# Patient Record
Sex: FEMALE | Race: BLACK OR AFRICAN AMERICAN | NOT HISPANIC OR LATINO | Employment: OTHER | URBAN - METROPOLITAN AREA
[De-identification: names, ages, dates, MRNs, and addresses within clinical notes are randomized per-mention and may not be internally consistent; named-entity substitution may affect disease eponyms.]

---

## 2017-04-06 ENCOUNTER — TELEPHONE (OUTPATIENT)
Dept: OBSTETRICS AND GYNECOLOGY | Facility: CLINIC | Age: 62
End: 2017-04-06

## 2017-04-06 NOTE — TELEPHONE ENCOUNTER
Patient was scheduled for annual exam on Thursday 6/22,reminder letter mailed,patient also states that she need the pap sear report for her insurance mailed to her,I verified patient's address on file and mailed on pap smear reports from 2013 and 2016,verbilazed understanding.

## 2017-04-06 NOTE — TELEPHONE ENCOUNTER
----- Message from Jayde Gomes sent at 4/6/2017 10:13 AM CDT -----  Contact: pt   X_  1st Request  _  2nd Request  _  3rd Request    Who:LEWIS MARCUS [[id number]]    Why: Patient states she would like to speak with the staff in regards to getting a copy of her PAP results... Please contact patient to further discuss and advise     What Number to Call Back: [phone]    When to Expect a call back: (Before the end of the day)   -- if call after 3:00 call back will be tomorrow.

## 2017-06-22 ENCOUNTER — OFFICE VISIT (OUTPATIENT)
Dept: OBSTETRICS AND GYNECOLOGY | Facility: CLINIC | Age: 62
End: 2017-06-22
Attending: OBSTETRICS & GYNECOLOGY
Payer: OTHER GOVERNMENT

## 2017-06-22 VITALS
DIASTOLIC BLOOD PRESSURE: 90 MMHG | WEIGHT: 153.69 LBS | SYSTOLIC BLOOD PRESSURE: 144 MMHG | BODY MASS INDEX: 30.17 KG/M2 | HEIGHT: 60 IN

## 2017-06-22 DIAGNOSIS — Z01.411 ENCOUNTER FOR GYNECOLOGICAL EXAMINATION WITH ABNORMAL FINDING: Primary | ICD-10-CM

## 2017-06-22 DIAGNOSIS — N64.4 MASTALGIA IN FEMALE: ICD-10-CM

## 2017-06-22 PROCEDURE — 99213 OFFICE O/P EST LOW 20 MIN: CPT | Mod: PBBFAC | Performed by: OBSTETRICS & GYNECOLOGY

## 2017-06-22 PROCEDURE — 99396 PREV VISIT EST AGE 40-64: CPT | Mod: S$PBB,,, | Performed by: OBSTETRICS & GYNECOLOGY

## 2017-06-22 PROCEDURE — 99999 PR PBB SHADOW E&M-EST. PATIENT-LVL III: CPT | Mod: PBBFAC,,, | Performed by: OBSTETRICS & GYNECOLOGY

## 2017-06-22 NOTE — PROGRESS NOTES
SUBJECTIVE:   62 y.o. female   for annual routine Pap and checkup. No LMP recorded. Patient is postmenopausal..  She complains of left breast pain.  Had recent mmg which was normal.  Throbbing..        Past Medical History:   Diagnosis Date    Clotting disorder     GERD (gastroesophageal reflux disease)     Glaucoma     elevated pressure in left eye     Hypertension     MGUS (monoclonal gammopathy of unknown significance)      History reviewed. No pertinent surgical history.  Social History     Social History    Marital status:      Spouse name: N/A    Number of children: N/A    Years of education: N/A     Occupational History    Not on file.     Social History Main Topics    Smoking status: Former Smoker     Packs/day: 0.50     Quit date: 1996    Smokeless tobacco: Former User    Alcohol use 1.2 oz/week     2 Cans of beer per week    Drug use: No    Sexual activity: Not Currently     Partners: Male     Birth control/ protection: Post-menopausal     Other Topics Concern    Not on file     Social History Narrative    No narrative on file     Family History   Problem Relation Age of Onset    Clotting disorder Mother     Colon cancer Mother     Ovarian cancer Neg Hx     Breast cancer Neg Hx      OB History    Para Term  AB Living   0 0 0 0 0 0   SAB TAB Ectopic Multiple Live Births   0 0 0 0                 Current Outpatient Prescriptions   Medication Sig Dispense Refill    diltiazem (TIAZAC) 240 MG CpSR Take 240 mg by mouth.  Capsule, Sustained Release Oral      dorzolamide (TRUSOPT) 2 % ophthalmic solution Place 1 drop into both eyes 2 (two) times daily.      nepafenac (NEVANAC) 0.1 % ophthalmic suspension Place 1 drop into both eyes 2 (two) times daily.        travoprost, benzalkonium, (TRAVATAN) 0.004 % ophthalmic solution Place 1 drop into both eyes nightly.       No current facility-administered medications for this visit.      Allergies: Review of  patient's allergies indicates no known allergies.     ROS:  Constitutional: no weight loss, weight gain, fever, fatigue  Eyes:  No vision changes, glasses/contacts  ENT/Mouth: No ulcers, sinus problems, ears ringing, headache  Cardiovascular: No inability to lie flat, chest pain, exercise intolerance, swelling, heart palpitations  Respiratory: No wheezing, coughing blood, shortness of breath, or cough  Gastrointestinal: No diarrhea, bloody stool, nausea/vomiting, constipation, gas, hemorrhoids  Genitourinary: No blood in urine, painful urination, urgency of urination, frequency of urination, incomplete emptying, incontinence, abnormal bleeding, painful periods, heavy periods, vaginal discharge, vaginal odor, painful intercourse, sexual problems, bleeding after intercourse.  Musculoskeletal: No muscle weakness  Skin/Breast: No painful breasts, nipple discharge, masses, rash, ulcers  Neurological: No passing out, seizures, numbness, headache  Endocrine: No diabetes, hypothyroid, hyperthyroid, hot flashes, hair loss, abnormal hair growth, ance  Psychiatric: No depression, crying  Hematologic: No bruises, bleeding, swollen lymph nodes, anemia.      OBJECTIVE:   The patient appears well, alert, oriented x 3, in no distress.  BP (!) 144/90 (BP Method: Manual)   Ht 5' (1.524 m)   Wt 69.7 kg (153 lb 10.6 oz)   BMI 30.01 kg/m²   NECK: no thyromegaly, trachea midline  SKIN: no acne, striae, hirsutism  BREAST EXAM: breasts appear normal, no suspicious masses, no skin or nipple changes or axillary nodes  ABDOMEN: soft, non-tender; bowel sounds normal; no masses,  no organomegaly and no hernias, masses, or hepatosplenomegaly  GENITALIA: normal external genitalia, no erythema, no discharge  URETHRA: normal appearing urethra with no masses, tenderness or lesions and normal urethra, normal urethral meatus  VAGINA: Normal  CERVIX: no lesions or cervical motion tenderness  UTERUS: normal size, contour, position, consistency,  mobility, non-tender  ADNEXA: no mass, fullness, tenderness    \  ASSESSMENT:   Viji was seen today for well woman and breast pain.    Diagnoses and all orders for this visit:    Encounter for gynecological examination with abnormal finding  Health Maintenance   Topic Date Due    Hepatitis C Screening  1955    Lipid Panel  1955    TETANUS VACCINE  03/30/1973    Colonoscopy  03/30/2005    Zoster Vaccine  03/30/2015    Influenza Vaccine  08/01/2017    Mammogram  04/18/2018    Pap Smear with HPV Cotest  06/21/2019       Mastalgia in female

## 2018-05-03 ENCOUNTER — TELEPHONE (OUTPATIENT)
Dept: OBSTETRICS AND GYNECOLOGY | Facility: CLINIC | Age: 63
End: 2018-05-03

## 2018-05-03 NOTE — TELEPHONE ENCOUNTER
----- Message from Jacqueline Garland sent at 5/3/2018  3:11 PM CDT -----  Contact: LEWIS MARCUS [[id number]]      Name of Who is Calling: LEWIS MARCUS [[id number]]      What is the request in detail: pt would like to confirm mammogram results was received by fax and added to chart. Please call     Can the clinic reply by [facility]: no    What Number to Call Back if not in [facility]: [phone]

## 2018-05-03 NOTE — TELEPHONE ENCOUNTER
Spoke with Pt;  Pt was calling to confirm that we had received her mammogram results via fax to be scanned into chart.    As of this afternoon, results have not be received.  Instructed  pt that I would call her in am to confirm or deny receipt of results.    Pt verbalized understanding and thanked me for call.

## 2018-05-04 ENCOUNTER — TELEPHONE (OUTPATIENT)
Dept: OBSTETRICS AND GYNECOLOGY | Facility: CLINIC | Age: 63
End: 2018-05-04

## 2018-05-04 NOTE — TELEPHONE ENCOUNTER
Spoke with pt:  Informed pt that I had received her Mammogram report from [facility] and would have it scanned into her chart.    Pt verbalized understanding and thanked me for call.

## 2018-06-25 ENCOUNTER — OFFICE VISIT (OUTPATIENT)
Dept: OBSTETRICS AND GYNECOLOGY | Facility: CLINIC | Age: 63
End: 2018-06-25
Payer: OTHER GOVERNMENT

## 2018-06-25 VITALS
DIASTOLIC BLOOD PRESSURE: 72 MMHG | SYSTOLIC BLOOD PRESSURE: 142 MMHG | WEIGHT: 152.13 LBS | BODY MASS INDEX: 29.86 KG/M2 | HEIGHT: 60 IN

## 2018-06-25 DIAGNOSIS — Z01.419 ENCOUNTER FOR CERVICAL PAP SMEAR WITH PELVIC EXAM: Primary | ICD-10-CM

## 2018-06-25 PROCEDURE — 99999 PR PBB SHADOW E&M-EST. PATIENT-LVL II: CPT | Mod: PBBFAC,,, | Performed by: OBSTETRICS & GYNECOLOGY

## 2018-06-25 PROCEDURE — 99396 PREV VISIT EST AGE 40-64: CPT | Mod: S$PBB,,, | Performed by: OBSTETRICS & GYNECOLOGY

## 2018-06-25 PROCEDURE — 99212 OFFICE O/P EST SF 10 MIN: CPT | Mod: PBBFAC | Performed by: OBSTETRICS & GYNECOLOGY

## 2018-06-25 RX ORDER — BRIMONIDINE TARTRATE, TIMOLOL MALEATE 2; 5 MG/ML; MG/ML
SOLUTION/ DROPS OPHTHALMIC
COMMUNITY
Start: 2018-04-03

## 2018-06-28 NOTE — PROGRESS NOTES
SUBJECTIVE:   63 y.o. female   for annual routine Pap and checkup. No LMP recorded. Patient is postmenopausal..  She has no unusual complaints.        Past Medical History:   Diagnosis Date    Clotting disorder     GERD (gastroesophageal reflux disease)     Glaucoma     elevated pressure in left eye     Hypertension     MGUS (monoclonal gammopathy of unknown significance)      History reviewed. No pertinent surgical history.  Social History     Social History    Marital status:      Spouse name: N/A    Number of children: N/A    Years of education: N/A     Occupational History    Not on file.     Social History Main Topics    Smoking status: Former Smoker     Packs/day: 0.50     Quit date: 1996    Smokeless tobacco: Former User    Alcohol use 1.2 oz/week     2 Cans of beer per week    Drug use: No    Sexual activity: Not Currently     Partners: Male     Birth control/ protection: Post-menopausal     Other Topics Concern    Not on file     Social History Narrative    No narrative on file     Family History   Problem Relation Age of Onset    Clotting disorder Mother     Colon cancer Mother     Ovarian cancer Neg Hx     Breast cancer Neg Hx      OB History    Para Term  AB Living   0 0 0 0 0 0   SAB TAB Ectopic Multiple Live Births   0 0 0 0                 Current Outpatient Prescriptions   Medication Sig Dispense Refill    COMBIGAN 0.2-0.5 % Drop       diltiazem (TIAZAC) 240 MG CpSR Take 240 mg by mouth.  Capsule, Sustained Release Oral      travoprost, benzalkonium, (TRAVATAN) 0.004 % ophthalmic solution Place 1 drop into both eyes nightly.      dorzolamide (TRUSOPT) 2 % ophthalmic solution Place 1 drop into both eyes 2 (two) times daily.       No current facility-administered medications for this visit.      Allergies: Patient has no known allergies.     ROS:  Constitutional: no weight loss, weight gain, fever, fatigue  Eyes:  No vision changes,  glasses/contacts  ENT/Mouth: No ulcers, sinus problems, ears ringing, headache  Cardiovascular: No inability to lie flat, chest pain, exercise intolerance, swelling, heart palpitations  Respiratory: No wheezing, coughing blood, shortness of breath, or cough  Gastrointestinal: No diarrhea, bloody stool, nausea/vomiting, constipation, gas, hemorrhoids  Genitourinary: No blood in urine, painful urination, urgency of urination, frequency of urination, incomplete emptying, incontinence, abnormal bleeding, painful periods, heavy periods, vaginal discharge, vaginal odor, painful intercourse, sexual problems, bleeding after intercourse.  Musculoskeletal: No muscle weakness  Skin/Breast: No painful breasts, nipple discharge, masses, rash, ulcers  Neurological: No passing out, seizures, numbness, headache  Endocrine: No diabetes, hypothyroid, hyperthyroid, hot flashes, hair loss, abnormal hair growth, ance  Psychiatric: No depression, crying  Hematologic: No bruises, bleeding, swollen lymph nodes, anemia.      OBJECTIVE:   The patient appears well, alert, oriented x 3, in no distress.  BP (!) 142/72   Ht 5' (1.524 m)   Wt 69 kg (152 lb 1.9 oz)   BMI 29.71 kg/m²   NECK: no thyromegaly, trachea midline  SKIN: no acne, striae, hirsutism  BREAST EXAM: breasts appear normal, no suspicious masses, no skin or nipple changes or axillary nodes  ABDOMEN: no hernias, masses, or hepatosplenomegaly  GENITALIA: normal external genitalia, no erythema, no discharge  URETHRA: normal urethra, normal urethral meatus  VAGINA: Normal  CERVIX: no lesions or cervical motion tenderness  UTERUS: normal size, contour, position, consistency, mobility, non-tender  ADNEXA: no mass, fullness, tenderness    \  ASSESSMENT:   Viji was seen today for annual exam.    Diagnoses and all orders for this visit:    Encounter for cervical Pap smear with pelvic exam  Health Maintenance   Topic Date Due    Hepatitis C Screening  1955    Lipid Panel   1955    TETANUS VACCINE  03/30/1973    Colonoscopy  03/30/2005    Zoster Vaccine  03/30/2015    Mammogram  04/18/2018    Influenza Vaccine  08/01/2018    Pap Smear with HPV Cotest  06/21/2019     MMG at [facility] and normal.

## 2019-06-25 ENCOUNTER — OFFICE VISIT (OUTPATIENT)
Dept: OBSTETRICS AND GYNECOLOGY | Facility: CLINIC | Age: 64
End: 2019-06-25
Payer: OTHER GOVERNMENT

## 2019-06-25 VITALS
DIASTOLIC BLOOD PRESSURE: 88 MMHG | SYSTOLIC BLOOD PRESSURE: 144 MMHG | HEIGHT: 60 IN | BODY MASS INDEX: 30.43 KG/M2 | WEIGHT: 155 LBS

## 2019-06-25 DIAGNOSIS — Z12.31 ENCOUNTER FOR SCREENING MAMMOGRAM FOR MALIGNANT NEOPLASM OF BREAST: ICD-10-CM

## 2019-06-25 DIAGNOSIS — Z01.419 ENCOUNTER FOR CERVICAL PAP SMEAR WITH PELVIC EXAM: Primary | ICD-10-CM

## 2019-06-25 PROCEDURE — 99396 PR PREVENTIVE VISIT,EST,40-64: ICD-10-PCS | Mod: S$PBB,,, | Performed by: OBSTETRICS & GYNECOLOGY

## 2019-06-25 PROCEDURE — 99396 PREV VISIT EST AGE 40-64: CPT | Mod: S$PBB,,, | Performed by: OBSTETRICS & GYNECOLOGY

## 2019-06-25 PROCEDURE — 87624 HPV HI-RISK TYP POOLED RSLT: CPT

## 2019-06-25 PROCEDURE — 99999 PR PBB SHADOW E&M-EST. PATIENT-LVL III: CPT | Mod: PBBFAC,,, | Performed by: OBSTETRICS & GYNECOLOGY

## 2019-06-25 PROCEDURE — 88175 CYTOPATH C/V AUTO FLUID REDO: CPT

## 2019-06-25 PROCEDURE — 99999 PR PBB SHADOW E&M-EST. PATIENT-LVL III: ICD-10-PCS | Mod: PBBFAC,,, | Performed by: OBSTETRICS & GYNECOLOGY

## 2019-06-25 PROCEDURE — 99213 OFFICE O/P EST LOW 20 MIN: CPT | Mod: PBBFAC | Performed by: OBSTETRICS & GYNECOLOGY

## 2019-06-25 RX ORDER — LATANOPROSTENE BUNOD 0.24 MG/ML
SOLUTION/ DROPS OPHTHALMIC
COMMUNITY
Start: 2019-04-25 | End: 2022-06-16

## 2019-06-25 NOTE — PROGRESS NOTES
SUBJECTIVE:   64 y.o. female   for annual routine Pap and checkup. No LMP recorded. Patient is postmenopausal..  She has no unusual complaints.        Past Medical History:   Diagnosis Date    Clotting disorder     GERD (gastroesophageal reflux disease)     Glaucoma     elevated pressure in left eye     Hypertension     MGUS (monoclonal gammopathy of unknown significance)      History reviewed. No pertinent surgical history.  Social History     Socioeconomic History    Marital status:      Spouse name: Not on file    Number of children: Not on file    Years of education: Not on file    Highest education level: Not on file   Occupational History    Not on file   Social Needs    Financial resource strain: Not on file    Food insecurity:     Worry: Not on file     Inability: Not on file    Transportation needs:     Medical: Not on file     Non-medical: Not on file   Tobacco Use    Smoking status: Former Smoker     Packs/day: 0.50     Last attempt to quit: 1996     Years since quittin.4    Smokeless tobacco: Former User   Substance and Sexual Activity    Alcohol use: Yes     Alcohol/week: 1.2 oz     Types: 2 Cans of beer per week    Drug use: No    Sexual activity: Not Currently     Partners: Male     Birth control/protection: Post-menopausal   Lifestyle    Physical activity:     Days per week: Not on file     Minutes per session: Not on file    Stress: Not on file   Relationships    Social connections:     Talks on phone: Not on file     Gets together: Not on file     Attends Worship service: Not on file     Active member of club or organization: Not on file     Attends meetings of clubs or organizations: Not on file     Relationship status: Not on file   Other Topics Concern    Not on file   Social History Narrative    Not on file     Family History   Problem Relation Age of Onset    Clotting disorder Mother     Colon cancer Mother     Ovarian cancer Neg Hx      Breast cancer Neg Hx      OB History    Para Term  AB Living   0 0 0 0 0 0   SAB TAB Ectopic Multiple Live Births   0 0 0 0           Current Outpatient Medications   Medication Sig Dispense Refill    COMBIGAN 0.2-0.5 % Drop       diltiazem (TIAZAC) 240 MG CpSR Take 240 mg by mouth.  Capsule, Sustained Release Oral      dorzolamide (TRUSOPT) 2 % ophthalmic solution Place 1 drop into both eyes 2 (two) times daily.      VYZULTA 0.024 % Drop        No current facility-administered medications for this visit.      Allergies: Patient has no known allergies.     ROS:  Constitutional: no weight loss, weight gain, fever, fatigue  Eyes:  No vision changes, glasses/contacts  ENT/Mouth: No ulcers, sinus problems, ears ringing, headache  Cardiovascular: No inability to lie flat, chest pain, exercise intolerance, swelling, heart palpitations  Respiratory: No wheezing, coughing blood, shortness of breath, or cough  Gastrointestinal: No diarrhea, bloody stool, nausea/vomiting, constipation, gas, hemorrhoids  Genitourinary: No blood in urine, painful urination, urgency of urination, frequency of urination, incomplete emptying, incontinence, abnormal bleeding, painful periods, heavy periods, vaginal discharge, vaginal odor, painful intercourse, sexual problems, bleeding after intercourse.  Musculoskeletal: No muscle weakness  Skin/Breast: No painful breasts, nipple discharge, masses, rash, ulcers  Neurological: No passing out, seizures, numbness, headache  Endocrine: No diabetes, hypothyroid, hyperthyroid, hot flashes, hair loss, abnormal hair growth, ance  Psychiatric: No depression, crying  Hematologic: No bruises, bleeding, swollen lymph nodes, anemia.      OBJECTIVE:   The patient appears well, alert, oriented x 3, in no distress.  BP (!) 144/88 (BP Location: Right arm, Patient Position: Sitting, BP Method: Medium (Manual))   Ht 5' (1.524 m)   Wt 70.3 kg (154 lb 15.7 oz)   BMI 30.27 kg/m²   NECK: no  thyromegaly, trachea midline  SKIN: no acne, striae, hirsutism  BREAST EXAM: breasts appear normal, no suspicious masses, no skin or nipple changes or axillary nodes  ABDOMEN: no hernias, masses, or hepatosplenomegaly  GENITALIA: normal external genitalia, no erythema, no discharge  URETHRA: normal appearing urethra with no masses, tenderness or lesions and normal urethra, normal urethral meatus  VAGINA: Normal  CERVIX: no lesions or cervical motion tenderness  UTERUS: normal size, contour, position, consistency, mobility, non-tender  ADNEXA: no mass, fullness, tenderness    \  ASSESSMENT:   Viji was seen today for well woman.    Diagnoses and all orders for this visit:    Encounter for cervical Pap smear with pelvic exam  -     Liquid-based pap smear, screening  -     HPV High Risk Genotypes, PCR  If normal, can repeat in 3 years and then stop.    Encounter for screening mammogram for malignant neoplasm of breast  -     Mammo Digital Screening Bilat w/ Reji; Future

## 2019-07-01 LAB
HPV HR 12 DNA CVX QL NAA+PROBE: NEGATIVE
HPV16 AG SPEC QL: NEGATIVE
HPV18 DNA SPEC QL NAA+PROBE: NEGATIVE

## 2019-07-02 ENCOUNTER — TELEPHONE (OUTPATIENT)
Dept: OBSTETRICS AND GYNECOLOGY | Facility: CLINIC | Age: 64
End: 2019-07-02

## 2019-07-02 NOTE — TELEPHONE ENCOUNTER
----- Message from Jaskaran Young sent at 7/2/2019  4:08 PM CDT -----  Please mail pt results from her pap

## 2019-07-02 NOTE — TELEPHONE ENCOUNTER
Spoke with pt    Pt was informed that her pap smear results were not available yet and letter would be sent upon Dr Colindres reviewing the results.    Pt verbalized understanding.

## 2020-03-23 ENCOUNTER — TELEPHONE (OUTPATIENT)
Dept: OBSTETRICS AND GYNECOLOGY | Facility: CLINIC | Age: 65
End: 2020-03-23

## 2020-03-23 NOTE — LETTER
March 23, 2020    Arlene Garcia  [address]  [place] [address]             [facility]  [address] [address]  Phone: [phone] Dear Ms. Garcia:    The results of your most recent Pap smear are normal. This means that no cancerous or precancerous cells were seen. We recommend that you come back in 2 year for your annual exam and 2 years for your next Pap smear.    If you have any questions or concerns, please don't hesitate to call.    Sincerely,        Leroy Colindres MD

## 2020-05-06 ENCOUNTER — TELEPHONE (OUTPATIENT)
Dept: OBSTETRICS AND GYNECOLOGY | Facility: CLINIC | Age: 65
End: 2020-05-06

## 2020-05-06 NOTE — TELEPHONE ENCOUNTER
----- Message from Tegan Swanson sent at 5/6/2020  2:08 PM CDT -----  Contact: LEWIS MARCUS [[id number]]  Name of Who is Calling: LEWIS MARCUS [[id number]]    What is the request in detail: Would like to speak with staff to schedule WWE appointment. No appointments populated after 6/25. Please contact to further discuss and advise      Can the clinic reply by [facility]: no    What Number to Call Back if not in [facility]: [phone]

## 2020-05-06 NOTE — TELEPHONE ENCOUNTER
----- Message from Katherine Horne sent at 5/6/2020  4:06 PM CDT -----  Contact: LEWIS MARCUS [[id number]]  Type:  Patient Returning Call    Who Called: LEWIS MARCUS [[id number]]    Who Left Message for Patient: Lucien Shah    Does the patient know what this is regarding?:N     Best Call Back Number:[phone]    Additional Information:

## 2020-05-26 ENCOUNTER — LAB VISIT (OUTPATIENT)
Dept: PRIMARY CARE CLINIC | Facility: CLINIC | Age: 65
End: 2020-05-26
Payer: MEDICARE

## 2020-05-26 DIAGNOSIS — R05.9 COUGH: Primary | ICD-10-CM

## 2020-05-26 PROCEDURE — U0003 INFECTIOUS AGENT DETECTION BY NUCLEIC ACID (DNA OR RNA); SEVERE ACUTE RESPIRATORY SYNDROME CORONAVIRUS 2 (SARS-COV-2) (CORONAVIRUS DISEASE [COVID-19]), AMPLIFIED PROBE TECHNIQUE, MAKING USE OF HIGH THROUGHPUT TECHNOLOGIES AS DESCRIBED BY CMS-2020-01-R: HCPCS

## 2020-05-27 LAB — SARS-COV-2 RNA RESP QL NAA+PROBE: NOT DETECTED

## 2020-05-28 ENCOUNTER — TELEPHONE (OUTPATIENT)
Dept: EMERGENCY MEDICINE | Facility: HOSPITAL | Age: 65
End: 2020-05-28

## 2020-05-28 NOTE — TELEPHONE ENCOUNTER
Pt left voicemail, returned call. Results of negative COVID-19 testing relayed to patient. Patient requesting documentation of negative COVID-19 results for clearance for colonoscopy - patient sent a message on portal with her results. Also, sent a letter to patients' home address with results.     Brenda Butt PA-C

## 2020-06-11 ENCOUNTER — OFFICE VISIT (OUTPATIENT)
Dept: OBSTETRICS AND GYNECOLOGY | Facility: CLINIC | Age: 65
End: 2020-06-11
Payer: MEDICARE

## 2020-06-11 VITALS
DIASTOLIC BLOOD PRESSURE: 80 MMHG | BODY MASS INDEX: 28.87 KG/M2 | SYSTOLIC BLOOD PRESSURE: 140 MMHG | WEIGHT: 147.06 LBS | HEIGHT: 60 IN

## 2020-06-11 DIAGNOSIS — Z12.31 ENCOUNTER FOR SCREENING MAMMOGRAM FOR MALIGNANT NEOPLASM OF BREAST: ICD-10-CM

## 2020-06-11 DIAGNOSIS — Z01.419 WELL WOMAN EXAM WITH ROUTINE GYNECOLOGICAL EXAM: Primary | ICD-10-CM

## 2020-06-11 DIAGNOSIS — R10.32 COLICKY LLQ ABDOMINAL PAIN: ICD-10-CM

## 2020-06-11 PROCEDURE — G0101 CA SCREEN;PELVIC/BREAST EXAM: HCPCS | Mod: S$PBB,,, | Performed by: OBSTETRICS & GYNECOLOGY

## 2020-06-11 PROCEDURE — 99213 OFFICE O/P EST LOW 20 MIN: CPT | Mod: PBBFAC,25 | Performed by: OBSTETRICS & GYNECOLOGY

## 2020-06-11 PROCEDURE — G0101 PR CA SCREEN;PELVIC/BREAST EXAM: ICD-10-PCS | Mod: S$PBB,,, | Performed by: OBSTETRICS & GYNECOLOGY

## 2020-06-11 PROCEDURE — 99999 PR PBB SHADOW E&M-EST. PATIENT-LVL III: ICD-10-PCS | Mod: PBBFAC,,, | Performed by: OBSTETRICS & GYNECOLOGY

## 2020-06-11 PROCEDURE — 99999 PR PBB SHADOW E&M-EST. PATIENT-LVL III: CPT | Mod: PBBFAC,,, | Performed by: OBSTETRICS & GYNECOLOGY

## 2020-06-11 PROCEDURE — G0101 CA SCREEN;PELVIC/BREAST EXAM: HCPCS | Mod: PBBFAC

## 2020-06-11 RX ORDER — ACETAZOLAMIDE 250 MG/1
TABLET ORAL
COMMUNITY
Start: 2020-05-22

## 2020-06-11 RX ORDER — MOXIFLOXACIN 5 MG/ML
SOLUTION/ DROPS OPHTHALMIC
COMMUNITY
Start: 2020-05-22 | End: 2022-06-16

## 2020-06-11 RX ORDER — TRANEXAMIC ACID 650 MG/1
TABLET ORAL
COMMUNITY
Start: 2020-03-25 | End: 2022-06-16

## 2020-06-11 NOTE — PROGRESS NOTES
Subjective:       Patient ID: Arlene Garcia is a 65 y.o. female.    Chief Complaint:  Well Woman and Abdominal Pain      History of Present Illness  HPI  Annual Exam-Postmenopausal  Patient presents for annual exam. The patient has no complaints today. The patient is sexually active. GYN screening history: last pap: was normal and last mammogram: was normal. The patient is not taking hormone replacement therapy. Patient denies post-menopausal vaginal bleeding. The patient wears seatbelts: yes. The patient participates in regular exercise: no. Has the patient ever been transfused or tattooed?: no. The patient reports that there is not domestic violence in her life.    Has been recently having some LLQ pain.  Seems colicy.  No clear triggers.  No increase in abd girth.  No blood in stool.  Has an appt with GI next week.     GYN & OB History  No LMP recorded. Patient is postmenopausal.   Date of Last Pap: 2019    OB History    Para Term  AB Living   0 0 0 0 0 0   SAB TAB Ectopic Multiple Live Births   0 0 0 0         Review of Systems  Review of Systems   Constitutional: Negative for activity change, appetite change and fatigue.   HENT: Negative.  Negative for tinnitus.    Eyes: Negative.    Respiratory: Negative for cough and shortness of breath.    Cardiovascular: Negative for chest pain and palpitations.   Gastrointestinal: Positive for abdominal pain. Negative for blood in stool, constipation, diarrhea and nausea.   Endocrine: Negative.  Negative for hot flashes.   Genitourinary: Negative for dysmenorrhea, dyspareunia, dysuria, frequency, menstrual problem, pelvic pain, vaginal discharge, urinary incontinence and postcoital bleeding.   Musculoskeletal: Negative for back pain and joint swelling.   Integumentary:  Negative for rash, breast mass, nipple discharge and breast skin changes.   Neurological: Negative.  Negative for headaches.   Hematological: Negative.  Does not bruise/bleed easily.    Psychiatric/Behavioral: The patient is not nervous/anxious.    Breast: negative.  Negative for mass, nipple discharge and skin changes          Objective:    Physical Exam:   Constitutional: Vital signs are normal. She appears well-developed and well-nourished. She is active and cooperative. She does not appear ill. No distress.    HENT:   Head: Normocephalic and atraumatic.   Nose: Nose normal.   Mouth/Throat: Oropharynx is clear and moist and mucous membranes are normal.    Eyes: Pupils are equal, round, and reactive to light. Conjunctivae, EOM and lids are normal.    Neck: Full passive range of motion without pain. No muscular tenderness present. No neck rigidity. No thyroid mass and no thyromegaly present.    Cardiovascular: Normal rate, regular rhythm, S1 normal, S2 normal, normal heart sounds and normal pulses.     Pulmonary/Chest: Effort normal and breath sounds normal. No accessory muscle usage. Right breast exhibits no inverted nipple, no mass, no nipple discharge, no skin change, no tenderness and no swelling. Left breast exhibits no inverted nipple, no mass, no nipple discharge, no skin change, no tenderness and no swelling.        Abdominal: Soft. Normal appearance and bowel sounds are normal. She exhibits no distension, no ascites and no mass. There is no hepatosplenomegaly. There is no tenderness. There is no rigidity, no rebound and no guarding.     Genitourinary: Vagina normal and uterus normal. Pelvic exam was performed with patient supine. There is no tenderness or injury on the right labia. There is no tenderness or injury on the left labia. Uterus is not deviated, not hosting fibroids and not experiencing uterine prolapse. Cervix is normal. Right adnexum displays no mass and no fullness. Left adnexum displays no mass and no fullness. No erythema, rectocele or cystocele in the vagina. No vaginal discharge found. Labial bartholins normal.Cervix exhibits no motion tenderness and no discharge.               Lymphadenopathy:        Right: No inguinal adenopathy present.        Left: No inguinal adenopathy present.    Neurological: She is alert. She has normal strength.    Skin: Skin is warm, dry and intact.    Psychiatric: She has a normal mood and affect. Her behavior is normal. Thought content normal. Her mood appears not anxious. Her affect is not inappropriate. Her speech is not slurred. She is not agitated and not aggressive. Thought content is not paranoid. Cognition and memory are normal. She expresses no suicidal plans and no homicidal plans.          Assessment:        1. Well woman exam with routine gynecological exam    2. Colicky LLQ abdominal pain    3. Encounter for screening mammogram for malignant neoplasm of breast                Plan:      Arlene was seen today for well woman and abdominal pain.    Diagnoses and all orders for this visit:    Well woman exam with routine gynecological exam  Health Maintenance   Topic Date Due    Hepatitis C Screening  1955    TETANUS VACCINE  03/30/1973    DEXA SCAN  03/30/1995    Colonoscopy  03/30/2005    Mammogram  04/18/2018    Pneumococcal Vaccine (65+ Low/Medium Risk) (1 of 2 - PCV13) 03/30/2020    Lipid Panel  05/01/2025       Colicky LLQ abdominal pain  We discussed utility of transvaginal u/s to further assess left ovary.  Pt nervous about discomfort from a pelvic u/s.  Advised pt that our clinical exam was negative and she wants to await her evaluation by GI prior to proceeding with transvaginal u/s.  She will call our office if she wishes to proceed.    Encounter for screening mammogram for malignant neoplasm of breast  -     Mammo Digital Screening Bilat w/ Reji; Future

## 2020-06-12 ENCOUNTER — TELEPHONE (OUTPATIENT)
Dept: OBSTETRICS AND GYNECOLOGY | Facility: CLINIC | Age: 65
End: 2020-06-12

## 2020-06-12 NOTE — TELEPHONE ENCOUNTER
Spoke with pt  Staff instructed pt that Dr Colindres placed her Mammo orders as part of her WWE exam.  Pt has not had Mammograms done at [facility] - she uses [facility] for her Mammo screening.  Pt is having 2019 records sent to Dr Colindres's office to be scanned to her Chart.    Pt verbalized understanding.

## 2020-06-12 NOTE — TELEPHONE ENCOUNTER
----- Message from Tegan Swanson sent at 6/12/2020 12:41 PM CDT -----  Contact: LEWIS MARCUS [[id number]]  Name of Who is Calling: LEWIS MARCUS [[id number]]    What is the request in detail: Patient states she had mammogram done already and she would like to know why is there another one being ordered.  Please contact to further discuss and advise      Can the clinic reply by [facility]: no    What Number to Call Back if not in [facility]: [phone]

## 2020-09-20 ENCOUNTER — HOSPITAL ENCOUNTER (EMERGENCY)
Facility: HOSPITAL | Age: 65
Discharge: HOME | End: 2020-09-20
Attending: EMERGENCY MEDICINE
Payer: MEDICARE

## 2020-09-20 VITALS
BODY MASS INDEX: 28.71 KG/M2 | WEIGHT: 147 LBS | HEART RATE: 58 BPM | OXYGEN SATURATION: 97 % | TEMPERATURE: 98 F | DIASTOLIC BLOOD PRESSURE: 82 MMHG | SYSTOLIC BLOOD PRESSURE: 157 MMHG | RESPIRATION RATE: 18 BRPM

## 2020-09-20 DIAGNOSIS — R42 DIZZINESS: ICD-10-CM

## 2020-09-20 DIAGNOSIS — R42 VERTIGO: Primary | ICD-10-CM

## 2020-09-20 PROCEDURE — 93010 EKG 12-LEAD: ICD-10-PCS | Mod: ,,, | Performed by: INTERNAL MEDICINE

## 2020-09-20 PROCEDURE — 25000003 PHARM REV CODE 250: Mod: ER | Performed by: EMERGENCY MEDICINE

## 2020-09-20 PROCEDURE — 93010 ELECTROCARDIOGRAM REPORT: CPT | Mod: ,,, | Performed by: INTERNAL MEDICINE

## 2020-09-20 PROCEDURE — 93005 ELECTROCARDIOGRAM TRACING: CPT | Mod: ER

## 2020-09-20 PROCEDURE — 99283 EMERGENCY DEPT VISIT LOW MDM: CPT | Mod: 25,ER

## 2020-09-20 RX ORDER — DIAZEPAM 5 MG/1
5 TABLET ORAL
Status: COMPLETED | OUTPATIENT
Start: 2020-09-20 | End: 2020-09-20

## 2020-09-20 RX ORDER — MECLIZINE HYDROCHLORIDE 25 MG/1
50 TABLET ORAL
Status: COMPLETED | OUTPATIENT
Start: 2020-09-20 | End: 2020-09-20

## 2020-09-20 RX ORDER — MECLIZINE HYDROCHLORIDE 25 MG/1
25 TABLET ORAL 3 TIMES DAILY PRN
Qty: 20 TABLET | Refills: 0 | Status: SHIPPED | OUTPATIENT
Start: 2020-09-20 | End: 2020-12-22

## 2020-09-20 RX ADMIN — MECLIZINE HYDROCHLORIDE 50 MG: 25 TABLET ORAL at 10:09

## 2020-09-20 RX ADMIN — DIAZEPAM 5 MG: 5 TABLET ORAL at 10:09

## 2020-09-20 NOTE — DISCHARGE INSTRUCTIONS
Watch instructional video on how to perform the Epley maneuver.  May repeat the maneuver as directed as often as needed for recurrent vertigo.

## 2020-09-20 NOTE — ED PROVIDER NOTES
"Encounter Date: 2020       History     Chief Complaint   Patient presents with    Dizziness     Pt states," I woke up dizzy this morning, I checked my pressure it was high."     65 y.o. female with multiple medical problems including hypertension, glaucoma, GERD and others presents emergency department complaining of acute dizziness that she knows upon waking this morning around 7:00 a.m..  She describes dizziness as a room spinning sensation which she notices soon as she opened her eyes this morning.  Reports mild associated nausea.  She reports dizziness has been intermittent lasting a few seconds at a time with about three total episodes prior to arrival.  Dizziness has currently resolved.  Patient reports taking her blood pressure this morning around 8:00 a.m. and states her blood pressure reading this morning was 170/80.        Review of patient's allergies indicates:  No Known Allergies  Past Medical History:   Diagnosis Date    Clotting disorder     GERD (gastroesophageal reflux disease)     Glaucoma     elevated pressure in left eye     Hypertension     MGUS (monoclonal gammopathy of unknown significance)      Past Surgical History:   Procedure Laterality Date    bilateral eye sx       Family History   Problem Relation Age of Onset    Clotting disorder Mother     Colon cancer Mother     Ovarian cancer Neg Hx     Breast cancer Neg Hx      Social History     Tobacco Use    Smoking status: Former Smoker     Packs/day: 0.50     Quit date: 1996     Years since quittin.7    Smokeless tobacco: Former User   Substance Use Topics    Alcohol use: Yes     Alcohol/week: 2.0 standard drinks     Types: 2 Cans of beer per week    Drug use: No     Review of Systems   Constitutional: Negative for fever.   Respiratory: Negative for shortness of breath.    Cardiovascular: Negative for palpitations and leg swelling.   Gastrointestinal: Positive for nausea. Negative for vomiting. "   Musculoskeletal: Negative for gait problem.   Neurological: Positive for dizziness. Negative for syncope, facial asymmetry, speech difficulty, weakness and headaches.   All other systems reviewed and are negative.      Physical Exam     Initial Vitals [09/20/20 0948]   BP Pulse Resp Temp SpO2   (!) 188/107 77 16 98 °F (36.7 °C) 99 %      MAP       --         Physical Exam    Nursing note and vitals reviewed.  Constitutional: She appears well-developed and well-nourished. She is not diaphoretic. No distress.   HENT:   Head: Normocephalic and atraumatic.   Eyes: Conjunctivae are normal. Pupils are equal, round, and reactive to light.   Neck: Normal range of motion. Neck supple.   Cardiovascular: Normal rate and intact distal pulses.   Pulmonary/Chest: No accessory muscle usage. No tachypnea. No respiratory distress.   Abdominal: She exhibits no distension. There is no abdominal tenderness.   Musculoskeletal: Normal range of motion. No tenderness.   Neurological: She is alert and oriented to person, place, and time. She has normal strength. No cranial nerve deficit or sensory deficit. She displays a negative Romberg sign. Coordination and gait normal. GCS eye subscore is 4. GCS verbal subscore is 5. GCS motor subscore is 6.   Skin: Skin is warm. Capillary refill takes less than 2 seconds.   Psychiatric: She has a normal mood and affect.         ED Course   Procedures  Labs Reviewed - No data to display  EKG Readings: (Independently Interpreted)   Initial Reading: No STEMI. Rhythm: Normal Sinus Rhythm. Heart Rate: 74 bpm. Ectopy: No Ectopy. Conduction: Normal. ST Segments: Normal ST Segments. T Waves: Normal. Axis: Left Axis Deviation. Clinical Impression: Normal Sinus Rhythm     ECG Results          EKG 12-lead (Final result)  Result time 09/21/20 19:50:02    Final result by Interface, Lab In [facility] (09/21/20 19:50:02)                 Narrative:    Test Reason : R42,    Vent. Rate : 074 BPM     Atrial Rate : 074  BPM     P-R Int : 126 ms          QRS Dur : 074 ms      QT Int : 412 ms       P-R-T Axes : 051 -18 -74 degrees     QTc Int : 457 ms    Normal sinus rhythm  Cannot rule out Anterior infarct ,age undetermined  Abnormal ECG  No previous ECGs available  Confirmed by Don Johnson MD ([id number]) on 9/21/2020 7:49:54 PM    Referred By: TURNER   SELF           Confirmed By:Don Johnson MD                            Imaging Results    None                 Labs Reviewed       Imaging Reviewed    Imaging Results    None         Medications given in ED    Medications   diazePAM tablet 5 mg (5 mg Oral Given 9/20/20 1021)   meclizine tablet 50 mg (50 mg Oral Given 9/20/20 1021)       Note was created using voice recognition software. Note may have occasional typographical errors that may not have been identified and edited despite good kailee initial review prior to signing.                ED Course as of Sep 22 0521   Sun Sep 20, 2020   1123 Dizziness resolved.  Patient able to ambulate in ED without recurrent symptoms    [DL]      ED Course User Index  [DL] Emerald Tucker MD            Clinical Impression:       ICD-10-CM ICD-9-CM   1. Vertigo  R42 780.4   2. Dizziness  R42 780.4                          ED Disposition Condition    Discharge Stable        ED Prescriptions     Medication Sig Dispense Start Date End Date Auth. Provider    meclizine (ANTIVERT) 25 mg tablet Take 1 tablet (25 mg total) by mouth 3 (three) times daily as needed. 20 tablet 9/20/2020  Emerald Tucker MD        Follow-up Information     Follow up With Specialties Details Why Contact Info    Kike Walker MD Internal Medicine Call in 1 day to schedule an appointment, for re-evaluation of today's complaint, and ongoing care [address]  [place] [address] [phone]      [facility] - Neurology Neurology Call in 1 day to schedule an appointment, for re-evaluation of today's complaint [address] [facility] [address] [phone]                                        Emerald Tucker MD  09/22/20 0557

## 2020-10-17 ENCOUNTER — HOSPITAL ENCOUNTER (EMERGENCY)
Facility: HOSPITAL | Age: 65
Discharge: HOME | End: 2020-10-17
Attending: EMERGENCY MEDICINE
Payer: MEDICARE

## 2020-10-17 VITALS
DIASTOLIC BLOOD PRESSURE: 86 MMHG | HEART RATE: 100 BPM | OXYGEN SATURATION: 97 % | SYSTOLIC BLOOD PRESSURE: 168 MMHG | BODY MASS INDEX: 28.71 KG/M2 | TEMPERATURE: 100 F | RESPIRATION RATE: 18 BRPM | WEIGHT: 147 LBS

## 2020-10-17 DIAGNOSIS — K57.92 DIVERTICULITIS: Primary | ICD-10-CM

## 2020-10-17 LAB
ALBUMIN SERPL-MCNC: 4.4 G/DL (ref 3.3–5.5)
ALBUMIN SERPL-MCNC: 4.4 G/DL (ref 3.3–5.5)
ALLENS TEST: ABNORMAL
ALP SERPL-CCNC: 103 U/L (ref 42–141)
ALP SERPL-CCNC: 97 U/L (ref 42–141)
BILIRUB SERPL-MCNC: 1 MG/DL (ref 0.2–1.6)
BILIRUB SERPL-MCNC: 1 MG/DL (ref 0.2–1.6)
BILIRUBIN, POC UA: NEGATIVE
BLOOD, POC UA: ABNORMAL
BUN SERPL-MCNC: 6 MG/DL (ref 7–22)
CALCIUM SERPL-MCNC: 9.3 MG/DL (ref 8–10.3)
CHLORIDE SERPL-SCNC: 107 MMOL/L (ref 98–108)
CLARITY, POC UA: CLEAR
COLOR, POC UA: YELLOW
CREAT SERPL-MCNC: 0.7 MG/DL (ref 0.6–1.2)
GLUCOSE SERPL-MCNC: 110 MG/DL (ref 73–118)
GLUCOSE, POC UA: NEGATIVE
HCO3 UR-SCNC: 25.8 MMOL/L (ref 24–28)
KETONES, POC UA: NEGATIVE
LDH SERPL L TO P-CCNC: 0.82 MMOL/L (ref 0.5–2.2)
LEUKOCYTE EST, POC UA: NEGATIVE
NITRITE, POC UA: NEGATIVE
PCO2 BLDA: 49.7 MMHG (ref 35–45)
PH SMN: 7.32 [PH] (ref 7.35–7.45)
PH UR STRIP: 7 [PH]
PO2 BLDA: 18 MMHG (ref 40–60)
POC ALT (SGPT): 17 U/L (ref 10–47)
POC ALT (SGPT): 21 U/L (ref 10–47)
POC AMYLASE: 83 U/L (ref 14–97)
POC AST (SGOT): 24 U/L (ref 11–38)
POC AST (SGOT): 25 U/L (ref 11–38)
POC BE: -1 MMOL/L
POC GGT: 36 U/L (ref 5–65)
POC SATURATED O2: 23 % (ref 95–100)
POC TCO2: 27 MMOL/L (ref 24–29)
POC TCO2: 29 MMOL/L (ref 18–33)
POTASSIUM BLD-SCNC: 3.4 MMOL/L (ref 3.6–5.1)
PROTEIN, POC UA: NEGATIVE
PROTEIN, POC: 7.3 G/DL (ref 6.4–8.1)
PROTEIN, POC: 7.3 G/DL (ref 6.4–8.1)
SAMPLE: ABNORMAL
SITE: ABNORMAL
SODIUM BLD-SCNC: 140 MMOL/L (ref 128–145)
SPECIFIC GRAVITY, POC UA: 1.02
UROBILINOGEN, POC UA: 0.2 E.U./DL

## 2020-10-17 PROCEDURE — 25000003 PHARM REV CODE 250: Mod: ER | Performed by: PHYSICIAN ASSISTANT

## 2020-10-17 PROCEDURE — 96375 TX/PRO/DX INJ NEW DRUG ADDON: CPT | Mod: ER

## 2020-10-17 PROCEDURE — 25500020 PHARM REV CODE 255: Mod: ER | Performed by: EMERGENCY MEDICINE

## 2020-10-17 PROCEDURE — 87040 BLOOD CULTURE FOR BACTERIA: CPT | Mod: 59

## 2020-10-17 PROCEDURE — 80053 COMPREHEN METABOLIC PANEL: CPT | Mod: ER

## 2020-10-17 PROCEDURE — 82803 BLOOD GASES ANY COMBINATION: CPT | Mod: ER

## 2020-10-17 PROCEDURE — 63600175 PHARM REV CODE 636 W HCPCS: Mod: ER | Performed by: PHYSICIAN ASSISTANT

## 2020-10-17 PROCEDURE — 85025 COMPLETE CBC W/AUTO DIFF WBC: CPT | Mod: ER

## 2020-10-17 PROCEDURE — 96361 HYDRATE IV INFUSION ADD-ON: CPT | Mod: ER

## 2020-10-17 PROCEDURE — S0030 INJECTION, METRONIDAZOLE: HCPCS | Mod: ER | Performed by: PHYSICIAN ASSISTANT

## 2020-10-17 PROCEDURE — 82040 ASSAY OF SERUM ALBUMIN: CPT | Mod: ER

## 2020-10-17 PROCEDURE — 96365 THER/PROPH/DIAG IV INF INIT: CPT | Mod: ER

## 2020-10-17 PROCEDURE — 81003 URINALYSIS AUTO W/O SCOPE: CPT | Mod: ER

## 2020-10-17 PROCEDURE — 96367 TX/PROPH/DG ADDL SEQ IV INF: CPT | Mod: ER

## 2020-10-17 PROCEDURE — 99285 EMERGENCY DEPT VISIT HI MDM: CPT | Mod: 25,ER

## 2020-10-17 RX ORDER — CIPROFLOXACIN 500 MG/1
500 TABLET ORAL 2 TIMES DAILY
Qty: 14 TABLET | Refills: 0 | Status: SHIPPED | OUTPATIENT
Start: 2020-10-17 | End: 2020-10-24

## 2020-10-17 RX ORDER — METRONIDAZOLE 500 MG/1
500 TABLET ORAL EVERY 12 HOURS
Qty: 14 TABLET | Refills: 0 | Status: SHIPPED | OUTPATIENT
Start: 2020-10-17 | End: 2020-10-24

## 2020-10-17 RX ORDER — MORPHINE SULFATE 4 MG/ML
4 INJECTION, SOLUTION INTRAMUSCULAR; INTRAVENOUS
Status: COMPLETED | OUTPATIENT
Start: 2020-10-17 | End: 2020-10-17

## 2020-10-17 RX ORDER — METRONIDAZOLE 500 MG/100ML
500 INJECTION, SOLUTION INTRAVENOUS
Status: COMPLETED | OUTPATIENT
Start: 2020-10-17 | End: 2020-10-17

## 2020-10-17 RX ORDER — CIPROFLOXACIN 2 MG/ML
400 INJECTION, SOLUTION INTRAVENOUS
Status: COMPLETED | OUTPATIENT
Start: 2020-10-17 | End: 2020-10-17

## 2020-10-17 RX ORDER — HYDROCODONE BITARTRATE AND ACETAMINOPHEN 5; 325 MG/1; MG/1
1 TABLET ORAL EVERY 6 HOURS PRN
Qty: 8 TABLET | Refills: 0 | Status: SHIPPED | OUTPATIENT
Start: 2020-10-17 | End: 2020-12-22

## 2020-10-17 RX ADMIN — MORPHINE SULFATE 4 MG: 4 INJECTION INTRAVENOUS at 11:10

## 2020-10-17 RX ADMIN — CIPROFLOXACIN 400 MG: 2 INJECTION, SOLUTION INTRAVENOUS at 01:10

## 2020-10-17 RX ADMIN — SODIUM CHLORIDE 1000 ML: 0.9 INJECTION, SOLUTION INTRAVENOUS at 12:10

## 2020-10-17 RX ADMIN — METRONIDAZOLE 500 MG: 500 INJECTION, SOLUTION INTRAVENOUS at 02:10

## 2020-10-17 RX ADMIN — IOHEXOL 100 ML: 350 INJECTION, SOLUTION INTRAVENOUS at 12:10

## 2020-10-17 NOTE — ED PROVIDER NOTES
"Encounter Date: 10/17/2020    SCRIBE #1 NOTE: I, Elke Darnell, am scribing for, and in the presence of,  DINA Daily. I have scribed the following portions of the note - Other sections scribed: HPI, ROS, PE.       History     Chief Complaint   Patient presents with    Abdominal Pain     Pt states," I have pains in my left lower stomach since last night. I have a kidney stone."     Arlene Garcia is a 65 y.o. female who presents to the ED complaining of right-sided intermittent abdominal pain x 2 months. Reports this episode started yesterday and episodes usually last for about 1 day, but this one hasn't resolved. Reports pain is always in the same place and non-radiating. Patient has hemophilia and diverticulosis. Denies fever, dysuria, diarrhea, constipation, vomiting, hematuria and blood in stool. Denies taking medications for pain. Denies any allergies to medications. Reports she saw her PCP and according to results she has a kidney stone and a cyst.    The history is provided by the patient. No  was used.     Review of patient's allergies indicates:  No Known Allergies  Past Medical History:   Diagnosis Date    Clotting disorder     GERD (gastroesophageal reflux disease)     Glaucoma     elevated pressure in left eye     Hypertension     MGUS (monoclonal gammopathy of unknown significance)      Past Surgical History:   Procedure Laterality Date    bilateral eye sx       Family History   Problem Relation Age of Onset    Clotting disorder Mother     Colon cancer Mother     Ovarian cancer Neg Hx     Breast cancer Neg Hx      Social History     Tobacco Use    Smoking status: Former Smoker     Packs/day: 0.50     Quit date: 1996     Years since quittin.8    Smokeless tobacco: Former User   Substance Use Topics    Alcohol use: Yes     Alcohol/week: 2.0 standard drinks     Types: 2 Cans of beer per week    Drug use: No     Review of Systems   Constitutional: " Negative for fever.   Gastrointestinal: Positive for abdominal pain. Negative for blood in stool, constipation, diarrhea and vomiting.   Genitourinary: Negative for dysuria and hematuria.   All other systems reviewed and are negative.      Physical Exam     Initial Vitals [10/17/20 1039]   BP Pulse Resp Temp SpO2   (!) 175/97 105 16 99.6 °F (37.6 °C) 98 %      MAP       --         Physical Exam    Nursing note and vitals reviewed.  Constitutional: She appears well-developed and well-nourished. No distress.   HENT:   Head: Normocephalic and atraumatic.   Right Ear: External ear normal.   Left Ear: External ear normal.   Eyes: Conjunctivae are normal.   Neck: Normal range of motion. Neck supple.   Cardiovascular: Normal rate and regular rhythm.   Pulmonary/Chest: Effort normal. No respiratory distress.   Abdominal: Soft. Normal appearance and bowel sounds are normal. She exhibits no distension. There is abdominal tenderness in the left lower quadrant. There is no rebound and no guarding.   Musculoskeletal: Normal range of motion.   Neurological: She is alert and oriented to person, place, and time.   Skin: Skin is warm and dry. No rash noted.   Psychiatric: She has a normal mood and affect. Her behavior is normal.         ED Course   Procedures  Labs Reviewed   POCT URINALYSIS W/O SCOPE - Abnormal; Notable for the following components:       Result Value    Blood, UA 2+ (*)     All other components within normal limits   POCT CMP - Abnormal; Notable for the following components:    POC BUN 6 (*)     POC Potassium 3.4 (*)     All other components within normal limits   ISTAT PROCEDURE - Abnormal; Notable for the following components:    POC PH 7.323 (*)     POC PCO2 49.7 (*)     POC PO2 18 (*)     POC SATURATED O2 23 (*)     All other components within normal limits   CULTURE, BLOOD   CULTURE, BLOOD   POCT URINALYSIS W/O SCOPE   POCT CBC   POCT CMP   POCT LIVER PANEL   POCT LIVER PANEL              Imaging Results            CT Abdomen Pelvis With Contrast (Final result)  Result time 10/17/20 12:55:03    Final result by Yordy Sapp MD (10/17/20 12:55:03)                 Impression:      This report was flagged in Epic as abnormal.    1. Findings most consistent with proximal sigmoid/distal descending colonic diverticulitis.  No large volume free air or findings to suggest abscess at this time.  Follow-up colonoscopy is recommended to exclude underlying malignancy.  2. Findings suggesting hepatic steatosis, correlation with LFTs recommended.  3. Please see above for several additional findings.      Electronically signed by: Yordy Sapp MD  Date:    10/17/2020  Time:    12:55             Narrative:    EXAMINATION:  CT ABDOMEN PELVIS WITH CONTRAST    CLINICAL HISTORY:  LLQ abdominal pain, diverticulitis suspected;    TECHNIQUE:  Low dose axial images, sagittal and coronal reformations were obtained from the lung bases to the pubic symphysis following the IV administration of 100 mL of Omnipaque 350 .  Oral contrast was not given.    COMPARISON:  None.    FINDINGS:  Images of the lower thorax are remarkable for minimal dependent atelectasis.    The liver is hypoattenuating suggesting steatosis, correlation with LFTs recommended.  The spleen, pancreas, adrenal glands and gallbladder are grossly unremarkable.  There is no biliary dilation or ascites.  The portal vein, splenic vein, SMV, celiac axis and SMA all are patent.  The stomach is decompressed.  No significant abdominal lymphadenopathy.    The kidneys enhance symmetrically without hydronephrosis.  Right nonobstructive nephrolithiasis, no left nephrolithiasis.  There is a low attenuating lesion arising from the lower pole the right kidney measuring 1.8 cm, attenuation of which suggests cyst.  The bilateral ureters are unable to be followed in their entirety to the urinary bladder, no definite calculi seen or secondary findings to suggest obstructive uropathy.  The  urinary bladder is unremarkable.  The uterus and adnexa is grossly unremarkable noting prominent vascularity within the left adnexa, correlation with any history of pelvic congestion syndrome as warranted.    None there is a small amount of free fluid in the deep pelvis.  There are multiple scattered colonic diverticula.  There is wall thickening and inflammation involving a short segment of distal descending colon/proximal sigmoid colon most consistent with acute diverticulitis.  No large volume free air or focal organized collection.  Diverticula are noted elsewhere along the course of the large bowel without additional regions of inflammation.  The terminal ileum and appendix are unremarkable.  The small bowel is unremarkable.  Scattered shotty periaortic and paracaval lymph nodes are noted.  There is atherosclerotic calcification of the aorta and its branches.    Degenerative changes are noted of the right femoroacetabular joint and spine.  There is osteopenia.  No significant inguinal lymphadenopathy.                              X-Rays:   Independently Interpreted Readings:   Abdomen: Diverticulitis of the sigmoid colon.  No evidence of abscess, obstruction, or perforation.     Medical Decision Making:   History:   Old Medical Records: I decided to obtain old medical records.  Clinical Tests:   Lab Tests: Ordered and Reviewed  Radiological Study: Reviewed and Ordered  ED Management:  65-year-old female with left lower quadrant pain.  CT evidence of diverticulitis without complication.  She has an elevated white blood cell count, but is otherwise well-appearing, afebrile, and feels well.  She is tolerating p.o..  Will discharge home with continued Cipro Flagyl and analgesics.  Patient instructed follow up with PCP and Gastroenterology.  Return precautions were given.            Scribe Attestation:   Scribe #1: I performed the above scribed service and the documentation accurately describes the services I  performed. I attest to the accuracy of the note.     I, Robert Chao, personally performed the services described in this documentation. All medical record entries made by the scribe were at my direction and in my presence.  I have reviewed the chart and agree that the record reflects my personal performance and is accurate and complete                    Clinical Impression:     ICD-10-CM ICD-9-CM   1. Diverticulitis  K57.92 562.11                          ED Disposition Condition    Discharge Stable        ED Prescriptions     Medication Sig Dispense Start Date End Date Auth. Provider    ciprofloxacin HCl (CIPRO) 500 MG tablet Take 1 tablet (500 mg total) by mouth 2 (two) times daily. for 7 days 14 tablet 10/17/2020 10/24/2020 Robert Chao PA-C    metroNIDAZOLE (FLAGYL) 500 MG tablet Take 1 tablet (500 mg total) by mouth every 12 (twelve) hours. for 7 days 14 tablet 10/17/2020 10/24/2020 Robert Chao PA-C    HYDROcodone-acetaminophen (NORCO) 5-325 mg per tablet Take 1 tablet by mouth every 6 (six) hours as needed. 8 tablet 10/17/2020  Robert Chao PA-C        Follow-up Information     Follow up With Specialties Details Why Contact Info    [facility]-All Locations Gastroenterology Schedule an appointment as soon as possible for a visit  For further evaluation [address] [place] [address]  [address]  [facility] [address]  [phone]      Kike Walker MD Internal Medicine Schedule an appointment as soon as possible for a visit  For follow-up care [address] [facility]  [place] [id number] [phone]      [facility] Emergency Department Emergency Medicine Go to  If symptoms worsen [address] [place] [facility] [address] [phone]                                       Robert Chao PA-C  10/17/20 5107

## 2020-10-22 LAB
BACTERIA BLD CULT: NORMAL
BACTERIA BLD CULT: NORMAL

## 2020-12-21 ENCOUNTER — OFFICE VISIT (OUTPATIENT)
Dept: OBSTETRICS AND GYNECOLOGY | Facility: CLINIC | Age: 65
End: 2020-12-21
Payer: MEDICARE

## 2020-12-21 VITALS
BODY MASS INDEX: 26.35 KG/M2 | WEIGHT: 139.56 LBS | SYSTOLIC BLOOD PRESSURE: 110 MMHG | HEIGHT: 61 IN | DIASTOLIC BLOOD PRESSURE: 62 MMHG

## 2020-12-21 DIAGNOSIS — N94.89 PELVIC CONGESTION SYNDROME: Primary | ICD-10-CM

## 2020-12-21 DIAGNOSIS — R10.10 PAIN OF UPPER ABDOMEN: ICD-10-CM

## 2020-12-21 DIAGNOSIS — L73.9 FOLLICULITIS OF PERINEUM: ICD-10-CM

## 2020-12-21 LAB
BILIRUB SERPL-MCNC: NORMAL MG/DL
BLOOD URINE, POC: ABNORMAL
CLARITY, POC UA: ABNORMAL
COLOR, POC UA: ABNORMAL
GLUCOSE UR QL STRIP: NORMAL
KETONES UR QL STRIP: ABNORMAL
LEUKOCYTE ESTERASE URINE, POC: ABNORMAL
NITRITE, POC UA: ABNORMAL
PH, POC UA: 5
PROTEIN, POC: ABNORMAL
SPECIFIC GRAVITY, POC UA: 10.15
UROBILINOGEN, POC UA: NEGATIVE

## 2020-12-21 PROCEDURE — 99213 PR OFFICE/OUTPT VISIT, EST, LEVL III, 20-29 MIN: ICD-10-PCS | Mod: S$PBB,,, | Performed by: OBSTETRICS & GYNECOLOGY

## 2020-12-21 PROCEDURE — 99999 PR PBB SHADOW E&M-EST. PATIENT-LVL III: ICD-10-PCS | Mod: PBBFAC,,, | Performed by: OBSTETRICS & GYNECOLOGY

## 2020-12-21 PROCEDURE — 81002 URINALYSIS NONAUTO W/O SCOPE: CPT | Mod: PBBFAC | Performed by: OBSTETRICS & GYNECOLOGY

## 2020-12-21 PROCEDURE — 99213 OFFICE O/P EST LOW 20 MIN: CPT | Mod: S$PBB,,, | Performed by: OBSTETRICS & GYNECOLOGY

## 2020-12-21 PROCEDURE — 99999 PR PBB SHADOW E&M-EST. PATIENT-LVL III: CPT | Mod: PBBFAC,,, | Performed by: OBSTETRICS & GYNECOLOGY

## 2020-12-21 PROCEDURE — 99213 OFFICE O/P EST LOW 20 MIN: CPT | Mod: PBBFAC | Performed by: OBSTETRICS & GYNECOLOGY

## 2020-12-21 RX ORDER — NORETHINDRONE 5 MG/1
5 TABLET ORAL DAILY
Qty: 30 TABLET | Refills: 11 | Status: SHIPPED | OUTPATIENT
Start: 2020-12-21 | End: 2021-06-14 | Stop reason: SDUPTHER

## 2020-12-21 RX ORDER — MUPIROCIN CALCIUM 20 MG/G
CREAM TOPICAL
Qty: 30 G | Refills: 0 | Status: SHIPPED | OUTPATIENT
Start: 2020-12-21 | End: 2021-06-14

## 2020-12-22 ENCOUNTER — HOSPITAL ENCOUNTER (OUTPATIENT)
Dept: RADIOLOGY | Facility: OTHER | Age: 65
Discharge: HOME | End: 2020-12-22
Attending: OBSTETRICS & GYNECOLOGY
Payer: MEDICARE

## 2020-12-22 DIAGNOSIS — N94.89 PELVIC CONGESTION SYNDROME: ICD-10-CM

## 2020-12-22 PROCEDURE — 76856 US EXAM PELVIC COMPLETE: CPT | Mod: 26,,, | Performed by: RADIOLOGY

## 2020-12-22 PROCEDURE — 76830 TRANSVAGINAL US NON-OB: CPT | Mod: 26,,, | Performed by: RADIOLOGY

## 2020-12-22 PROCEDURE — 76830 US PELVIS COMP WITH TRANSVAG NON-OB (XPD): ICD-10-PCS | Mod: 26,,, | Performed by: RADIOLOGY

## 2020-12-22 PROCEDURE — 76856 US PELVIS COMP WITH TRANSVAG NON-OB (XPD): ICD-10-PCS | Mod: 26,,, | Performed by: RADIOLOGY

## 2020-12-22 PROCEDURE — 76830 TRANSVAGINAL US NON-OB: CPT | Mod: TC

## 2020-12-22 NOTE — PROGRESS NOTES
Subjective:       Patient ID: Arlene Garcia is a 65 y.o. female.    Chief Complaint:  Abdominal Pain      History of Present Illness  HPI  Pt is 65 y.o. known to me who presents with lower abdominal pain.  It is been going on for a little bit of time.  She had a recent CT scan that found evidence of a kidney stone.  She also had evaluation by Gastroenterology and was treated with antibiotics for diverticulitis.  But the pain is persisting.  She feels like the pain is different from her kidney stone pain.  She was concerned that there may be a gynecologic origin.    No postmenopausal bleeding.    GYN & OB History  No LMP recorded. Patient is postmenopausal.   Date of Last Pap: No result found    OB History    Para Term  AB Living   0 0 0 0 0 0   SAB TAB Ectopic Multiple Live Births   0 0 0 0         Review of Systems  Review of Systems   Constitutional: Negative for activity change, appetite change and fatigue.   HENT: Negative.  Negative for tinnitus.    Eyes: Negative.    Respiratory: Negative for cough and shortness of breath.    Cardiovascular: Negative for chest pain and palpitations.   Gastrointestinal: Positive for abdominal pain. Negative for blood in stool, constipation, diarrhea and nausea.   Endocrine: Negative.  Negative for hot flashes.   Genitourinary: Negative for dysmenorrhea, dyspareunia, dysuria, frequency, menstrual problem, pelvic pain, vaginal discharge, urinary incontinence and postcoital bleeding.   Musculoskeletal: Negative for back pain and joint swelling.   Integumentary:  Negative for rash, breast mass, nipple discharge and breast skin changes.   Neurological: Negative.  Negative for headaches.   Hematological: Negative.  Does not bruise/bleed easily.   Psychiatric/Behavioral: The patient is not nervous/anxious.    Breast: negative.  Negative for mass, nipple discharge and skin changes          Objective:    Physical Exam:   Constitutional: She is oriented to person, place,  and time. She appears well-developed and well-nourished. No distress.    HENT:   Head: Normocephalic and atraumatic.    Eyes: Pupils are equal, round, and reactive to light. Conjunctivae and lids are normal.    Neck: Normal range of motion and full passive range of motion without pain. Neck supple.    Cardiovascular: Normal rate and regular rhythm.  Exam reveals no edema.     Pulmonary/Chest: Effort normal and breath sounds normal. She has no wheezes.        Abdominal: Soft. Normal appearance and bowel sounds are normal. She exhibits no distension. There is no abdominal tenderness. There is no rebound and no guarding.     Genitourinary:    Vagina and uterus normal.      Pelvic exam was performed with patient supine.   There is no tenderness or lesion on the right labia. There is no tenderness or lesion on the left labia. Uterus is not deviated, not fixed and not hosting fibroids. Cervix is normal. Right adnexum displays no mass and no tenderness. Left adnexum displays no mass and no tenderness. No rectocele, cystocele or unspecified prolapse of vaginal walls in the vagina. Labial bartholins normal.Cervix exhibits no motion tenderness and no discharge.    Genitourinary Comments: Folliculitis of hair on right labia   negative for vaginal discharge          Musculoskeletal: Normal range of motion and moves all extremeties.       Neurological: She is alert and oriented to person, place, and time. She has normal strength.    Skin: Skin is warm and dry.    Psychiatric: She has a normal mood and affect. Her speech is normal and behavior is normal. Thought content normal. Her mood appears not anxious. She does not exhibit a depressed mood. She expresses no suicidal plans and no homicidal plans.          Assessment:        1. Pelvic congestion syndrome    2. Pain of upper abdomen    3. Folliculitis of perineum                Plan:      Arlene was seen today for abdominal pain.    Diagnoses and all orders for this  visit:    Pelvic congestion syndrome  -     US Pelvis Comp with Transvag NON-OB (xpd; Future  -     norethindrone (AYGESTIN) 5 mg Tab; Take 1 tablet (5 mg total) by mouth once daily.  As we reviewed her CT scan, they do comment on possible pelvic congestion syndrome.  That could lead to some heaviness symptoms.  But the case is difficult given the other pathology that she is being treated for.  Would recommend trying a dose of daily progestin to see if that helps with the possible pelvic congestion syndrome.  Would also recommend coordinating a transvaginal ultrasound for better assessment of her ovaries.  Pain of upper abdomen  -     POCT urine dipstick without microscope    Folliculitis of perineum  -     mupirocin calcium 2% (BACTROBAN) 2 % cream; Apply to affected area 3 times daily

## 2020-12-23 ENCOUNTER — TELEPHONE (OUTPATIENT)
Dept: OBSTETRICS AND GYNECOLOGY | Facility: CLINIC | Age: 65
End: 2020-12-23

## 2020-12-23 NOTE — TELEPHONE ENCOUNTER
Spoke with pt  Pt was retuning a call, but there was no information regarding a call placed to pt.    Staff reviewed pts US reports as well as Dr Colindres medication recommendation.  Pt will start medication tomorrow.  Pt verbalized understanding.

## 2020-12-23 NOTE — TELEPHONE ENCOUNTER
----- Message from Jaskaran Young sent at 12/23/2020  4:24 PM CST -----  PT RETURNING YOUR CALL [phone]

## 2021-01-26 ENCOUNTER — TELEPHONE (OUTPATIENT)
Dept: OBSTETRICS AND GYNECOLOGY | Facility: CLINIC | Age: 66
End: 2021-01-26

## 2021-04-16 ENCOUNTER — PATIENT MESSAGE (OUTPATIENT)
Dept: RESEARCH | Facility: HOSPITAL | Age: 66
End: 2021-04-16

## 2021-06-14 ENCOUNTER — OFFICE VISIT (OUTPATIENT)
Dept: OBSTETRICS AND GYNECOLOGY | Facility: CLINIC | Age: 66
End: 2021-06-14
Payer: MEDICARE

## 2021-06-14 VITALS
HEIGHT: 61 IN | WEIGHT: 149.69 LBS | BODY MASS INDEX: 28.26 KG/M2 | SYSTOLIC BLOOD PRESSURE: 171 MMHG | DIASTOLIC BLOOD PRESSURE: 102 MMHG

## 2021-06-14 DIAGNOSIS — Z01.419 WELL WOMAN EXAM WITH ROUTINE GYNECOLOGICAL EXAM: Primary | ICD-10-CM

## 2021-06-14 DIAGNOSIS — N94.89 PELVIC CONGESTION SYNDROME: ICD-10-CM

## 2021-06-14 DIAGNOSIS — L73.9 FOLLICULITIS OF PERINEUM: ICD-10-CM

## 2021-06-14 PROCEDURE — 99999 PR PBB SHADOW E&M-EST. PATIENT-LVL III: CPT | Mod: PBBFAC,,, | Performed by: OBSTETRICS & GYNECOLOGY

## 2021-06-14 PROCEDURE — G0101 CA SCREEN;PELVIC/BREAST EXAM: HCPCS | Mod: PBBFAC | Performed by: OBSTETRICS & GYNECOLOGY

## 2021-06-14 PROCEDURE — G0101 CA SCREEN;PELVIC/BREAST EXAM: HCPCS | Mod: S$PBB,GZ,, | Performed by: OBSTETRICS & GYNECOLOGY

## 2021-06-14 PROCEDURE — G0101 PR CA SCREEN;PELVIC/BREAST EXAM: ICD-10-PCS | Mod: S$PBB,GZ,, | Performed by: OBSTETRICS & GYNECOLOGY

## 2021-06-14 PROCEDURE — 99213 OFFICE O/P EST LOW 20 MIN: CPT | Mod: PBBFAC | Performed by: OBSTETRICS & GYNECOLOGY

## 2021-06-14 PROCEDURE — 99999 PR PBB SHADOW E&M-EST. PATIENT-LVL III: ICD-10-PCS | Mod: PBBFAC,,, | Performed by: OBSTETRICS & GYNECOLOGY

## 2021-06-14 RX ORDER — NORETHINDRONE 5 MG/1
5 TABLET ORAL DAILY
Qty: 90 TABLET | Refills: 4 | Status: SHIPPED | OUTPATIENT
Start: 2021-06-14 | End: 2021-12-16 | Stop reason: SDUPTHER

## 2021-06-14 RX ORDER — MUPIROCIN CALCIUM 20 MG/G
CREAM TOPICAL
Qty: 30 G | Refills: 0 | Status: SHIPPED | OUTPATIENT
Start: 2021-06-14 | End: 2022-06-14

## 2021-07-01 ENCOUNTER — PATIENT MESSAGE (OUTPATIENT)
Dept: ADMINISTRATIVE | Facility: OTHER | Age: 66
End: 2021-07-01

## 2021-09-20 ENCOUNTER — TELEPHONE (OUTPATIENT)
Dept: OBSTETRICS AND GYNECOLOGY | Facility: CLINIC | Age: 66
End: 2021-09-20

## 2021-12-16 DIAGNOSIS — N94.89 PELVIC CONGESTION SYNDROME: ICD-10-CM

## 2021-12-16 RX ORDER — NORETHINDRONE 5 MG/1
5 TABLET ORAL DAILY
Qty: 90 TABLET | Refills: 1 | Status: SHIPPED | OUTPATIENT
Start: 2021-12-16 | End: 2022-06-16 | Stop reason: SDUPTHER

## 2022-06-16 ENCOUNTER — OFFICE VISIT (OUTPATIENT)
Dept: OBSTETRICS AND GYNECOLOGY | Facility: CLINIC | Age: 67
End: 2022-06-16
Payer: MEDICARE

## 2022-06-16 VITALS
WEIGHT: 154.31 LBS | SYSTOLIC BLOOD PRESSURE: 162 MMHG | DIASTOLIC BLOOD PRESSURE: 94 MMHG | BODY MASS INDEX: 29.16 KG/M2

## 2022-06-16 DIAGNOSIS — Z12.4 SCREENING FOR CERVICAL CANCER: ICD-10-CM

## 2022-06-16 DIAGNOSIS — Z12.31 ENCOUNTER FOR SCREENING MAMMOGRAM FOR MALIGNANT NEOPLASM OF BREAST: ICD-10-CM

## 2022-06-16 DIAGNOSIS — Z01.419 WOMEN'S ANNUAL ROUTINE GYNECOLOGICAL EXAMINATION: Primary | ICD-10-CM

## 2022-06-16 DIAGNOSIS — N94.89 PELVIC CONGESTION SYNDROME: ICD-10-CM

## 2022-06-16 PROCEDURE — 88175 CYTOPATH C/V AUTO FLUID REDO: CPT | Performed by: OBSTETRICS & GYNECOLOGY

## 2022-06-16 PROCEDURE — 87624 HPV HI-RISK TYP POOLED RSLT: CPT | Performed by: OBSTETRICS & GYNECOLOGY

## 2022-06-16 PROCEDURE — 99999 PR PBB SHADOW E&M-EST. PATIENT-LVL III: ICD-10-PCS | Mod: PBBFAC,,, | Performed by: OBSTETRICS & GYNECOLOGY

## 2022-06-16 PROCEDURE — G0101 CA SCREEN;PELVIC/BREAST EXAM: HCPCS | Mod: S$PBB,,, | Performed by: OBSTETRICS & GYNECOLOGY

## 2022-06-16 PROCEDURE — 99213 OFFICE O/P EST LOW 20 MIN: CPT | Mod: PBBFAC,25 | Performed by: OBSTETRICS & GYNECOLOGY

## 2022-06-16 PROCEDURE — 99999 PR PBB SHADOW E&M-EST. PATIENT-LVL III: CPT | Mod: PBBFAC,,, | Performed by: OBSTETRICS & GYNECOLOGY

## 2022-06-16 PROCEDURE — G0101 PR CA SCREEN;PELVIC/BREAST EXAM: ICD-10-PCS | Mod: S$PBB,,, | Performed by: OBSTETRICS & GYNECOLOGY

## 2022-06-16 PROCEDURE — G0101 CA SCREEN;PELVIC/BREAST EXAM: HCPCS | Mod: PBBFAC | Performed by: OBSTETRICS & GYNECOLOGY

## 2022-06-16 RX ORDER — NORETHINDRONE 5 MG/1
5 TABLET ORAL DAILY
Qty: 90 TABLET | Refills: 5 | Status: SHIPPED | OUTPATIENT
Start: 2022-06-16 | End: 2022-09-06

## 2022-06-16 NOTE — PROGRESS NOTES
SUBJECTIVE:   67 y.o. female   for annual routine Pap and checkup. No LMP recorded. Patient is postmenopausal..  She has no unusual complaints.        Past Medical History:   Diagnosis Date    Clotting disorder     GERD (gastroesophageal reflux disease)     Glaucoma     elevated pressure in left eye     Hypertension     MGUS (monoclonal gammopathy of unknown significance)      Past Surgical History:   Procedure Laterality Date    bilateral eye sx       Social History     Socioeconomic History    Marital status:    Tobacco Use    Smoking status: Former Smoker     Packs/day: 0.50     Quit date: 1996     Years since quittin.4    Smokeless tobacco: Former User   Substance and Sexual Activity    Alcohol use: Yes     Alcohol/week: 2.0 standard drinks     Types: 2 Cans of beer per week    Drug use: No    Sexual activity: Not Currently     Partners: Male     Birth control/protection: Post-menopausal     Family History   Problem Relation Age of Onset    Clotting disorder Mother     Colon cancer Mother     Ovarian cancer Neg Hx     Breast cancer Neg Hx      OB History    Para Term  AB Living   0 0 0 0 0 0   SAB IAB Ectopic Multiple Live Births   0 0 0 0           Current Outpatient Medications   Medication Sig Dispense Refill    COMBIGAN 0.2-0.5 % Drop       diltiaZEM (TIAZAC) 240 MG Cs24 Take 300 mg by mouth.  Capsule, Sustained Release Oral      dorzolamide (TRUSOPT) 2 % ophthalmic solution Place 1 drop into both eyes 2 (two) times daily.      acetaZOLAMIDE (DIAMOX) 250 MG tablet       norethindrone (AYGESTIN) 5 mg Tab Take 1 tablet (5 mg total) by mouth once daily. 90 tablet 5     No current facility-administered medications for this visit.     Allergies: Patient has no known allergies.     ROS:  Constitutional: no weight loss, weight gain, fever, fatigue  Eyes:  No vision changes, glasses/contacts  ENT/Mouth: No ulcers, sinus problems, ears ringing,  headache  Cardiovascular: No inability to lie flat, chest pain, exercise intolerance, swelling, heart palpitations  Respiratory: No wheezing, coughing blood, shortness of breath, or cough  Gastrointestinal: No diarrhea, bloody stool, nausea/vomiting, constipation, gas, hemorrhoids  Genitourinary: No blood in urine, painful urination, urgency of urination, frequency of urination, incomplete emptying, incontinence, abnormal bleeding, painful periods, heavy periods, vaginal discharge, vaginal odor, painful intercourse, sexual problems, bleeding after intercourse.  Musculoskeletal: No muscle weakness  Skin/Breast: No painful breasts, nipple discharge, masses, rash, ulcers  Neurological: No passing out, seizures, numbness, headache  Endocrine: No diabetes, hypothyroid, hyperthyroid, hot flashes, hair loss, abnormal hair growth, ance  Psychiatric: No depression, crying  Hematologic: No bruises, bleeding, swollen lymph nodes, anemia.      OBJECTIVE:   The patient appears well, alert, oriented x 3, in no distress.  BP (!) 162/94   Wt 70 kg (154 lb 5.2 oz)   BMI 29.16 kg/m²   NECK: no thyromegaly, trachea midline  SKIN: no acne, striae, hirsutism  BREAST EXAM: breasts appear normal, no suspicious masses, no skin or nipple changes or axillary nodes  ABDOMEN: soft, non-tender; bowel sounds normal; no masses,  no organomegaly and no hernias, masses, or hepatosplenomegaly  GENITALIA: normal external genitalia, no erythema, no discharge  URETHRA: normal appearing urethra with no masses, tenderness or lesions and normal urethra, normal urethral meatus  VAGINA: Normal  CERVIX: no lesions or cervical motion tenderness  UTERUS: normal size, contour, position, consistency, mobility, non-tender  ADNEXA: no mass, fullness, tenderness    \  ASSESSMENT:   Viji was seen today for annual exam.    Diagnoses and all orders for this visit:    Women's annual routine gynecological examination    Pelvic congestion syndrome  -      norethindrone (AYGESTIN) 5 mg Tab; Take 1 tablet (5 mg total) by mouth once daily.    Encounter for screening mammogram for malignant neoplasm of breast  MMG Up to date  Screening for cervical cancer  -     HPV High Risk Genotypes, PCR  -     Liquid-Based Pap Smear, Screening  If pap negative, can stop pap screening.

## 2022-06-23 LAB
FINAL PATHOLOGIC DIAGNOSIS: NORMAL
Lab: NORMAL

## 2022-06-27 LAB
HPV HR 12 DNA SPEC QL NAA+PROBE: NEGATIVE
HPV16 AG SPEC QL: NEGATIVE
HPV18 DNA SPEC QL NAA+PROBE: NEGATIVE

## 2023-01-25 ENCOUNTER — HOSPITAL ENCOUNTER (EMERGENCY)
Facility: HOSPITAL | Age: 68
Discharge: HOME | End: 2023-01-25
Attending: EMERGENCY MEDICINE | Admitting: OTOLARYNGOLOGY
Payer: MEDICARE

## 2023-01-25 VITALS
RESPIRATION RATE: 16 BRPM | SYSTOLIC BLOOD PRESSURE: 167 MMHG | BODY MASS INDEX: 30.23 KG/M2 | OXYGEN SATURATION: 100 % | WEIGHT: 160 LBS | HEART RATE: 84 BPM | TEMPERATURE: 98 F | DIASTOLIC BLOOD PRESSURE: 79 MMHG

## 2023-01-25 DIAGNOSIS — J05.10 ACUTE EPIGLOTTITIS WITHOUT AIRWAY OBSTRUCTION: Primary | ICD-10-CM

## 2023-01-25 DIAGNOSIS — J02.9 SORE THROAT: ICD-10-CM

## 2023-01-25 LAB
ALBUMIN SERPL-MCNC: 4.3 G/DL (ref 3.3–5.5)
ALP SERPL-CCNC: 63 U/L (ref 42–141)
BILIRUB SERPL-MCNC: 0.7 MG/DL (ref 0.2–1.6)
BUN SERPL-MCNC: 8 MG/DL (ref 7–22)
CALCIUM SERPL-MCNC: 9.9 MG/DL (ref 8–10.3)
CHLORIDE SERPL-SCNC: 104 MMOL/L (ref 98–108)
CREAT SERPL-MCNC: 0.7 MG/DL (ref 0.6–1.2)
CTP QC/QA: YES
GLUCOSE SERPL-MCNC: 101 MG/DL (ref 73–118)
INFLUENZA A ANTIGEN, POC: NEGATIVE
INFLUENZA B ANTIGEN, POC: NEGATIVE
POC ALT (SGPT): 14 U/L (ref 10–47)
POC AST (SGOT): 33 U/L (ref 11–38)
POC RAPID STREP A: NEGATIVE
POC TCO2: 32 MMOL/L (ref 18–33)
POTASSIUM BLD-SCNC: 3.2 MMOL/L (ref 3.6–5.1)
PROTEIN, POC: 7.2 G/DL (ref 6.4–8.1)
SARS-COV-2 RDRP RESP QL NAA+PROBE: NEGATIVE
SODIUM BLD-SCNC: 136 MMOL/L (ref 128–145)

## 2023-01-25 PROCEDURE — 85025 COMPLETE CBC W/AUTO DIFF WBC: CPT | Mod: ER

## 2023-01-25 PROCEDURE — 87040 BLOOD CULTURE FOR BACTERIA: CPT | Performed by: NURSE PRACTITIONER

## 2023-01-25 PROCEDURE — 96365 THER/PROPH/DIAG IV INF INIT: CPT

## 2023-01-25 PROCEDURE — 87502 INFLUENZA DNA AMP PROBE: CPT

## 2023-01-25 PROCEDURE — 87635 SARS-COV-2 COVID-19 AMP PRB: CPT | Mod: ER | Performed by: EMERGENCY MEDICINE

## 2023-01-25 PROCEDURE — 96366 THER/PROPH/DIAG IV INF ADDON: CPT

## 2023-01-25 PROCEDURE — 25000003 PHARM REV CODE 250: Mod: ER | Performed by: NURSE PRACTITIONER

## 2023-01-25 PROCEDURE — 63600175 PHARM REV CODE 636 W HCPCS: Mod: ER | Performed by: EMERGENCY MEDICINE

## 2023-01-25 PROCEDURE — 96372 THER/PROPH/DIAG INJ SC/IM: CPT | Mod: 59 | Performed by: EMERGENCY MEDICINE

## 2023-01-25 PROCEDURE — 80053 COMPREHEN METABOLIC PANEL: CPT | Mod: ER

## 2023-01-25 PROCEDURE — 87070 CULTURE OTHR SPECIMN AEROBIC: CPT | Mod: 59 | Performed by: NURSE PRACTITIONER

## 2023-01-25 PROCEDURE — 87880 STREP A ASSAY W/OPTIC: CPT

## 2023-01-25 PROCEDURE — 25500020 PHARM REV CODE 255: Mod: ER | Performed by: EMERGENCY MEDICINE

## 2023-01-25 PROCEDURE — 25000003 PHARM REV CODE 250: Performed by: EMERGENCY MEDICINE

## 2023-01-25 PROCEDURE — 25000003 PHARM REV CODE 250: Mod: ER | Performed by: EMERGENCY MEDICINE

## 2023-01-25 PROCEDURE — 96375 TX/PRO/DX INJ NEW DRUG ADDON: CPT

## 2023-01-25 PROCEDURE — 99285 EMERGENCY DEPT VISIT HI MDM: CPT | Mod: 25

## 2023-01-25 RX ORDER — LIDOCAINE HYDROCHLORIDE 20 MG/ML
15 SOLUTION OROPHARYNGEAL ONCE
Status: COMPLETED | OUTPATIENT
Start: 2023-01-25 | End: 2023-01-25

## 2023-01-25 RX ORDER — METHYLPREDNISOLONE 4 MG/1
TABLET ORAL
Qty: 1 EACH | Refills: 0 | Status: SHIPPED | OUTPATIENT
Start: 2023-01-25 | End: 2023-02-15

## 2023-01-25 RX ORDER — MAG HYDROX/ALUMINUM HYD/SIMETH 200-200-20
30 SUSPENSION, ORAL (FINAL DOSE FORM) ORAL ONCE
Status: COMPLETED | OUTPATIENT
Start: 2023-01-25 | End: 2023-01-25

## 2023-01-25 RX ORDER — AMOXICILLIN AND CLAVULANATE POTASSIUM 875; 125 MG/1; MG/1
1 TABLET, FILM COATED ORAL 2 TIMES DAILY
Qty: 14 TABLET | Refills: 0 | Status: SHIPPED | OUTPATIENT
Start: 2023-01-25 | End: 2023-02-01

## 2023-01-25 RX ORDER — AMOXICILLIN AND CLAVULANATE POTASSIUM 875; 125 MG/1; MG/1
1 TABLET, FILM COATED ORAL
Status: COMPLETED | OUTPATIENT
Start: 2023-01-25 | End: 2023-01-25

## 2023-01-25 RX ORDER — DEXAMETHASONE SODIUM PHOSPHATE 4 MG/ML
12 INJECTION, SOLUTION INTRA-ARTICULAR; INTRALESIONAL; INTRAMUSCULAR; INTRAVENOUS; SOFT TISSUE
Status: COMPLETED | OUTPATIENT
Start: 2023-01-25 | End: 2023-01-25

## 2023-01-25 RX ADMIN — LIDOCAINE HYDROCHLORIDE 15 ML: 20 SOLUTION ORAL at 01:01

## 2023-01-25 RX ADMIN — DEXAMETHASONE SODIUM PHOSPHATE 12 MG: 4 INJECTION, SOLUTION INTRA-ARTICULAR; INTRALESIONAL; INTRAMUSCULAR; INTRAVENOUS; SOFT TISSUE at 05:01

## 2023-01-25 RX ADMIN — CEFTRIAXONE 1 G: 1 INJECTION, SOLUTION INTRAVENOUS at 05:01

## 2023-01-25 RX ADMIN — VANCOMYCIN HYDROCHLORIDE 1000 MG: 1 INJECTION, POWDER, LYOPHILIZED, FOR SOLUTION INTRAVENOUS at 05:01

## 2023-01-25 RX ADMIN — ALUMINUM HYDROXIDE, MAGNESIUM HYDROXIDE, AND SIMETHICONE 30 ML: 200; 200; 20 SUSPENSION ORAL at 01:01

## 2023-01-25 RX ADMIN — AMOXICILLIN AND CLAVULANATE POTASSIUM 1 TABLET: 875; 125 TABLET, FILM COATED ORAL at 11:01

## 2023-01-25 RX ADMIN — IOHEXOL 100 ML: 350 INJECTION, SOLUTION INTRAVENOUS at 03:01

## 2023-01-25 NOTE — ED PROVIDER NOTES
"Encounter Date: 2023    SCRIBE #1 NOTE: I, Nancy Miramontes, am scribing for, and in the presence of,  Catherine Valdez NP. I have scribed the following portions of the note - Other sections scribed: HPI, ROS.     History     Chief Complaint   Patient presents with    Sore Throat     Pt states cough and bad sore throat x2 days       CC: Sore throat    HPI: This is a 67 y.o. female, with a past medical history of HTN, GERD, clotting disorder, and others, presents to the ED with a chief complaint of sore throat with associated coughing and gagging, onset 2 days. Patient reports when swallowing, or after eating, she feels something in her throat (like mucus), but is unable to expel it. She reports swallowing exacerbates the gagging. She reports her  looking at her throat and stating "it looks like you palate is down". She endorses taking Mucinex and an allergy pill, with no relief. No other alleviating or exacerbating factors noted. Patient denies fever, chills, rhinorrhea, or ear pain. Per chart, pt has NKDA.     The history is provided by the patient and medical records. No  was used.   Review of patient's allergies indicates:  No Known Allergies  Past Medical History:   Diagnosis Date    Clotting disorder     GERD (gastroesophageal reflux disease)     Glaucoma     elevated pressure in left eye 2012    Hypertension     MGUS (monoclonal gammopathy of unknown significance)      Past Surgical History:   Procedure Laterality Date    bilateral eye sx       Family History   Problem Relation Age of Onset    Clotting disorder Mother     Colon cancer Mother     Ovarian cancer Neg Hx     Breast cancer Neg Hx      Social History     Tobacco Use    Smoking status: Former     Packs/day: 0.50     Types: Cigarettes     Quit date: 1996     Years since quittin.0    Smokeless tobacco: Former   Substance Use Topics    Alcohol use: Yes     Alcohol/week: 2.0 standard drinks     Types: 2 Cans of beer per " week    Drug use: No     Review of Systems   Constitutional:  Negative for chills and fever.   HENT:  Positive for sore throat. Negative for congestion, ear pain and rhinorrhea.         (+) Gagging   Eyes:  Negative for visual disturbance.   Respiratory:  Positive for cough. Negative for shortness of breath.    Cardiovascular:  Negative for chest pain.   Gastrointestinal:  Negative for abdominal pain, blood in stool, constipation, diarrhea, nausea and vomiting.   Genitourinary:  Negative for dysuria, frequency, hematuria and urgency.   Musculoskeletal:  Negative for arthralgias, back pain, joint swelling, myalgias and neck pain.   Skin:  Negative for color change, rash and wound.   Neurological:  Negative for dizziness, weakness, numbness and headaches.     Physical Exam     Initial Vitals [01/25/23 1056]   BP Pulse Resp Temp SpO2   (!) 197/94 77 20 98.3 °F (36.8 °C) 99 %      MAP       --         Physical Exam    Constitutional: She appears well-developed and well-nourished. She is not diaphoretic.  Non-toxic appearance. She does not have a sickly appearance. No distress.   Pleasant.  Well-appearing.  Conversant without any difficulty or distress.   HENT:   Head: Normocephalic and atraumatic.   Right Ear: External ear normal.   Left Ear: External ear normal.   Nose: Nose normal.   Mouth/Throat: Uvula is midline and mucous membranes are normal. Posterior oropharyngeal edema and posterior oropharyngeal erythema present. No oropharyngeal exudate or tonsillar abscesses.   Speaking in full and clear sentences without difficulty.  Normal voice.  No drooling or stridor.  Soft palate and base of the uvula visualized.  Unable to fully visualize posterior oropharyngeal due to patient comfort.    Eyes: Conjunctivae and EOM are normal. Pupils are equal, round, and reactive to light.   Neck:   Normal range of motion.  Cardiovascular:  Normal rate, regular rhythm, normal heart sounds and intact distal pulses.            Pulmonary/Chest: Breath sounds normal. No respiratory distress.   Abdominal: Abdomen is soft. Bowel sounds are normal. There is no abdominal tenderness.   Musculoskeletal:         General: Normal range of motion.      Cervical back: Normal range of motion.     Neurological: She is alert and oriented to person, place, and time.   Skin: Skin is warm and dry.   Psychiatric: She has a normal mood and affect. Her behavior is normal.       ED Course   Critical Care    Date/Time: 1/25/2023 5:38 PM  Performed by: Susan Shea DO  Authorized by: Susan Shea DO   Direct patient critical care time: 8 minutes  Additional history critical care time: 8 minutes  Ordering / reviewing critical care time: 8 minutes  Documentation critical care time: 8 minutes  Consulting other physicians critical care time: 8 minutes  Consult with family critical care time: 8 minutes  Total critical care time (exclusive of procedural time) : 48 minutes  Critical care was necessary to treat or prevent imminent or life-threatening deterioration of the following conditions: respiratory failure.  Critical care was time spent personally by me on the following activities: evaluation of patient's response to treatment, examination of patient, obtaining history from patient or surrogate, ordering and performing treatments and interventions, ordering and review of laboratory studies, ordering and review of radiographic studies, pulse oximetry, re-evaluation of patient's condition and review of old charts.  Comments: I provided a face-to-face evaluation of this patient.  Patient is agreeable to transfer and admission at [facility].  At time of my evaluation patient is comfortable, not in distress in tolerating secretions.  I reviewed labs, imaging, and documentation by mid-level provider.  I agree with documentation.    I consulted ENT at [facility] at 4:51 p.m..  Spoke with specialist Dr. Davila she recommends Decadron, racemic epi, and  antibiotics as well as immediate ENT evaluation.  There is no ENT coverage at [facility] after 5 p.m. today.  Specialist recommends transfer to [facility] for appropriate services.  At 4:59 p.m. I contacted the [facility] transfer center and spoke with Susana.  Requesting ED to ED transfer for emergent evaluation by ENT.  Patient was accepted by  at 5:38 p.m..  I requested a stat ambulance transfer.  At time of transfer patient is awake alert oriented x4 speaking in full sentences.  Patient is moving all 4 extremities without difficulty.      Labs Reviewed   POCT CMP - Abnormal; Notable for the following components:       Result Value    POC Potassium 3.2 (*)     All other components within normal limits   CULTURE, RESPIRATORY  - THROAT   CULTURE, BLOOD   CULTURE, BLOOD   SARS-COV-2 RDRP GENE   POCT CBC   POCT INFLUENZA A/B MOLECULAR   POCT STREP A MOLECULAR   POCT STREP A, RAPID   POCT RAPID INFLUENZA A/B   POCT CMP            Imaging Results              CT Soft Tissue Neck With Contrast (Final result)  Result time 01/25/23 16:25:41      Final result by Oscar Jessica MD (01/25/23 16:25:41)                   Impression:      In light of the history, mild edema of the epiglottis and aryepiglottic folds at least mildly narrowing the supraglottic larynx is questioned.  No abscess.      Electronically signed by: Oscar Jessica  Date:    01/25/2023  Time:    16:25               Narrative:    EXAMINATION:  CT SOFT TISSUE NECK WITH CONTRAST    CLINICAL HISTORY:  Epiglottitis or tonsillitis suspected;    TECHNIQUE:  Low dose axial images as well as sagittal and coronal reconstructions were performed from the skull base to the clavicles following the intravenous administration of 75 mL of Omnipaque 350.    COMPARISON:  Plain film study dated 01/25/2022    FINDINGS:  In keeping with the plain film study, and in light of the history, there is the suggestion of mild edema of the epiglottis and  aryepiglottic folds at least mildly narrowing the supraglottic larynx.  No focal abscess.  No retropharyngeal edema.  No peritonsillar abscess.    No definite soft tissue mass or adenopathy is identified in the neck.    The paranasal sinuses and mastoid air cells are essentially clear.  No salivary gland calculi.  No evidence of acute odontogenic periapical abscess.                                       X-Ray Neck Soft Tissue (Final result)  Result time 01/25/23 14:32:15      Final result by Oscar Jessica MD (01/25/23 14:32:15)                   Impression:      Particularly in light of the history, there is thickening/edema of the epiglottis and potentially aryepiglottic folds questioned with effacement of the vallecula.  Epiglottitis or underlying mass to be considered.  CT imaging would be helpful for further characterization      Electronically signed by: Oscar Jessica  Date:    01/25/2023  Time:    14:32               Narrative:    EXAMINATION:  XR NECK SOFT TISSUE    CLINICAL HISTORY:  Acute pharyngitis, unspecified    TECHNIQUE:  AP and lateral soft tissue views the neck were performed.    COMPARISON:  None.    FINDINGS:  In light of the history, there is thickening of the epiglottis, and potentially aryepiglottic folds, questioned with effacement of the vallecula.    No radiopaque foreign body.    No osseous destructive process.  Degenerative changes of the cervical spine with ventral endplate osteophyte formation at C4-5 and C5-6                                       Medications   racepinephrine 2.25 % nebulizer solution 0.5 mL (has no administration in time range)   aluminum-magnesium hydroxide-simethicone 200-200-20 mg/5 mL suspension 30 mL (30 mLs Oral Given 1/25/23 1313)     And   LIDOcaine HCl 2% oral solution 15 mL (15 mLs Oral Given 1/25/23 1313)   iohexoL (OMNIPAQUE 350) injection 100 mL (100 mLs Intravenous Given 1/25/23 2228)   cefTRIAXone (ROCEPHIN) 1 g/50 mL D5W IVPB (0 g Intravenous  Stopped 1/25/23 1742)   vancomycin (VANCOCIN) 1,000 mg in dextrose 5 % (D5W) 250 mL IVPB (Vial-Mate) (1,000 mg Intravenous New Bag 1/25/23 1740)   dexAMETHasone injection 12 mg (12 mg Intramuscular Given 1/25/23 1731)     Medical Decision Making:   History:   Old Medical Records: I decided to obtain old medical records.  Clinical Tests:   Lab Tests: Ordered and Reviewed  Radiological Study: Ordered and Reviewed  ED Management:  67-year-old female presenting to the ED for evaluation of throat discomfort.  Denies shortness of breath, difficulty swallowing.  She is pleasant and well-appearing in no distress.  Vital signs stable and reassuring.  No evidence of respiratory distress or airway compromise.  Strep, COVID, flu were negative.  X-ray with thickening/edema of the epiglottis and potentially aryepiglottic folds.  Epiglottitis or underlying mass to be considered.  CT scan recommended.  Patient was placed in a room.  IV was placed.  She was placed on a monitor.  CT scan was obtained which showed mild edema of the epiglottis and aryepiglottic folds with at least mild narrowing of the supraglottic larynx is questioned.  No abscess.  Patient remains stable.  Patient examined by my attending physician, Dr. Shea.  Patient remained stable well-appearing.  Discussed admission to the hospital which patient is agreeable. ENT Dr. Velasco contacted by Dr. Shea who recommends transfer to [facility] where ENT is available overnight.  Orders placed. Awaiting transfer.         Scribe Attestation:   Scribe #1: I performed the above scribed service and the documentation accurately describes the services I performed. I attest to the accuracy of the note.                  I, Dr. Susan Shea, personally performed the services described in this documentation. This document was produced by a scribe under my direction and in my presence. All medical record entries made by the scribe were at my direction and in my presence.  I  have reviewed the chart and agree that the record reflects my personal performance and is accurate and complete. Susan Shea DO.     01/25/2023 5:39 PM    Clinical Impression:   Final diagnoses:  [J02.9] Sore throat  [J05.10] Acute epiglottitis without airway obstruction (Primary)        Scribe attestation: I, FAUSTO Valdez, personally performed the services described in this documentation. All medical record entries made by the scribe were at my direction and in my presence.  I have reviewed the chart and agree that the record reflects my personal performance and is accurate and complete.      Catherine Valdez, NAVID  01/25/23 1757       Susan Shea DO  01/25/23 1925

## 2023-01-26 NOTE — ED TRIAGE NOTES
"Arlene Garcia, a 67 y.o. female presents to the ED w/ complaint of "a ball in her throat", she denies any shortness of breath or feelings of constricted airway.  Patient presents from [facility]- freestanding ER to see ENT.    Triage note:  Chief Complaint   Patient presents with    Sore Throat     Pt states cough and bad sore throat x2 days       Review of patient's allergies indicates:  No Known Allergies  Past Medical History:   Diagnosis Date    Clotting disorder     GERD (gastroesophageal reflux disease)     Glaucoma     elevated pressure in left eye 2012    Hypertension     MGUS (monoclonal gammopathy of unknown significance)        "

## 2023-01-26 NOTE — SUBJECTIVE & OBJECTIVE
Medications:  Continuous Infusions:  Scheduled Meds:  PRN Meds:racepinephrine     No current facility-administered medications on file prior to encounter.     Current Outpatient Medications on File Prior to Encounter   Medication Sig    acetaZOLAMIDE (DIAMOX) 250 MG tablet     COMBIGAN 0.2-0.5 % Drop     diltiaZEM (TIAZAC) 240 MG Cs24 Take 300 mg by mouth.  Capsule, Sustained Release Oral    dorzolamide (TRUSOPT) 2 % ophthalmic solution Place 1 drop into both eyes 2 (two) times daily.    norethindrone (AYGESTIN) 5 mg Tab TAKE 1 TABLET(5 MG) BY MOUTH EVERY DAY       Review of patient's allergies indicates:  No Known Allergies    Past Medical History:   Diagnosis Date    Clotting disorder     GERD (gastroesophageal reflux disease)     Glaucoma     elevated pressure in left eye     Hypertension     MGUS (monoclonal gammopathy of unknown significance)      Past Surgical History:   Procedure Laterality Date    bilateral eye sx       Family History       Problem Relation (Age of Onset)    Clotting disorder Mother    Colon cancer Mother          Tobacco Use    Smoking status: Former     Packs/day: 0.50     Types: Cigarettes     Quit date: 1996     Years since quittin.0    Smokeless tobacco: Former   Substance and Sexual Activity    Alcohol use: Yes     Alcohol/week: 2.0 standard drinks     Types: 2 Cans of beer per week    Drug use: No    Sexual activity: Not Currently     Partners: Male     Birth control/protection: Post-menopausal     Review of Systems   Constitutional:  Negative for appetite change, chills, fatigue, fever and unexpected weight change.   HENT:  Negative for congestion, drooling, ear pain, facial swelling, sore throat, trouble swallowing and voice change.    Respiratory:  Positive for choking. Negative for cough, shortness of breath, wheezing and stridor.    Gastrointestinal:  Negative for nausea and vomiting.   Musculoskeletal:  Negative for neck pain and neck stiffness.   Objective:      Vital Signs (Most Recent):  Temp: 98.3 °F (36.8 °C) (01/25/23 1056)  Pulse: (!) 55 (01/25/23 1800)  Resp: 18 (01/25/23 1800)  BP: (!) 156/80 (01/25/23 1800)  SpO2: 99 % (01/25/23 1800)   Vital Signs (24h Range):  Temp:  [98.3 °F (36.8 °C)] 98.3 °F (36.8 °C)  Pulse:  [53-77] 55  Resp:  [18-20] 18  SpO2:  [97 %-100 %] 99 %  BP: (156-197)/(80-96) 156/80     Weight: 72.6 kg (160 lb)  Body mass index is 30.23 kg/m².    Date 01/25/23 0700 - 01/26/23 0659   Shift 7360-2740 3847-4553 7772-2174 24 Hour Total   INTAKE   IV Piggyback  50  50   Shift Total(mL/kg)  50(0.7)  50(0.7)   OUTPUT   Shift Total(mL/kg)       Weight (kg) 72.6 72.6 72.6 72.6       Physical Exam  NAD  Awake and alert  Head atraumatic   MMM, anterior tongue mobile, FOM soft, unable to visualize OP anteriorly secondary to patient tolerance of tongue blade/anatomy   Neck soft, not TTP, normal ROM, no LAD  Normal WOB, no stridor or stertor on room air; able to lie flat without difficult          PROCEDURE: Flexible Fiberoptic Laryngoscope Exam  The risks, benefits, and alternatives to the procedure were explained. Patient/family  agreed to procedure; verbal consent obtained. The scope was inserted into the left nasal cavity. Examination of the nasal cavity, nasopharynx, oropharynx, hypopharynx, and larynx was performed; all were closely visualized to confirm presence or absence of erythema, edema, or structural lesions.     Pertinent exam findings include the following:  - Mild edema of uvula, uvula positionally abuts laryngeal epiglottis   - Mild edema and erythema at esophageal inlet   - No obvious masses or lesions  - Mobility of bilateral TVF with normal adduction and abduction  - No pooling of secretions     The scope was removed. The patient tolerated the procedure well without complications.      Significant Labs:  CBC at OSH 10.3k with predominant granulocytes and lymphocytes   Rapid Influenza A/B, COVID, strep A all negative    Significant  Diagnostics:  CT: I have reviewed all pertinent results/findings within the past 24 hours and my personal findings are:  mild edema of epiglottis

## 2023-01-26 NOTE — CONSULTS
"[facility]  Otorhinolaryngology-Head & Neck Surgery  Consult Note    Patient Name: Arlene Garcia  MRN: [id number]  Code Status: No Order  Admission Date: 1/25/2023  Hospital Length of Stay: 0 days  Attending Physician: Brenda Cunningham MD  Primary Care Provider: Kike Walker MD    Patient information was obtained from patient, spouse/SO, past medical records and ER records.     Inpatient consult to ENT  Consult performed by: Amina Monte MD  Consult ordered by: Brenda Cunningham MD        Subjective:     Chief Complaint/Reason for Admission: Airway evaluation    History of Present Illness: Arlene Garcia is a 67 y.o. female with past medical and surgical history as detailed below including HTN, GERD, MGUS, acquired vWF def that presents to outside ED with a chief complaint of sore throat with associated coughing and gagging, onset x 2 days. Reports when swallowing, or after eating, she feels something in her throat (like mucus), but is unable to expel it; states swallowing exacerbates the gagging states the problem is intermittent. Endorses taking Mucinex and an allergy pill without no relief. No other alleviating or exacerbating factors noted. CT performed noting thickening/edema of epiglottis. Transferred for ENT evaluation. Given rocephin and decadron at OSH, pt notes that since receiving these medications she feels the "something" in her throat is still present but smaller that previously.  Denies fever, chills, rhinorrhea, SOB, dyspnea, voice changes, neck pain/stiffness, congestion, drooling, or ear pain. Endorses prior h/o significant GERD though not currently taking any medications; denies any new medications or known allergies.                    Medications:  Continuous Infusions:  Scheduled Meds:  PRN Meds:racepinephrine     No current facility-administered medications on file prior to encounter.     Current Outpatient Medications on File Prior to Encounter   Medication Sig    " acetaZOLAMIDE (DIAMOX) 250 MG tablet     COMBIGAN 0.2-0.5 % Drop     diltiaZEM (TIAZAC) 240 MG Cs24 Take 300 mg by mouth.  Capsule, Sustained Release Oral    dorzolamide (TRUSOPT) 2 % ophthalmic solution Place 1 drop into both eyes 2 (two) times daily.    norethindrone (AYGESTIN) 5 mg Tab TAKE 1 TABLET(5 MG) BY MOUTH EVERY DAY       Review of patient's allergies indicates:  No Known Allergies    Past Medical History:   Diagnosis Date    Clotting disorder     GERD (gastroesophageal reflux disease)     Glaucoma     elevated pressure in left eye     Hypertension     MGUS (monoclonal gammopathy of unknown significance)      Past Surgical History:   Procedure Laterality Date    bilateral eye sx       Family History       Problem Relation (Age of Onset)    Clotting disorder Mother    Colon cancer Mother          Tobacco Use    Smoking status: Former     Packs/day: 0.50     Types: Cigarettes     Quit date: 1996     Years since quittin.0    Smokeless tobacco: Former   Substance and Sexual Activity    Alcohol use: Yes     Alcohol/week: 2.0 standard drinks     Types: 2 Cans of beer per week    Drug use: No    Sexual activity: Not Currently     Partners: Male     Birth control/protection: Post-menopausal     Review of Systems   Constitutional:  Negative for appetite change, chills, fatigue, fever and unexpected weight change.   HENT:  Negative for congestion, drooling, ear pain, facial swelling, sore throat, trouble swallowing and voice change.    Respiratory:  Positive for choking. Negative for cough, shortness of breath, wheezing and stridor.    Gastrointestinal:  Negative for nausea and vomiting.   Musculoskeletal:  Negative for neck pain and neck stiffness.   Objective:     Vital Signs (Most Recent):  Temp: 98.3 °F (36.8 °C) (23 1056)  Pulse: (!) 55 (23 1800)  Resp: 18 (23 1800)  BP: (!) 156/80 (23 1800)  SpO2: 99 % (23 1800)   Vital Signs (24h Range):  Temp:  [98.3  °F (36.8 °C)] 98.3 °F (36.8 °C)  Pulse:  [53-77] 55  Resp:  [18-20] 18  SpO2:  [97 %-100 %] 99 %  BP: (156-197)/(80-96) 156/80     Weight: 72.6 kg (160 lb)  Body mass index is 30.23 kg/m².    Date 01/25/23 0700 - 01/26/23 0659   Shift 4590-8639 6190-1501 7232-5944 24 Hour Total   INTAKE   IV Piggyback  50  50   Shift Total(mL/kg)  50(0.7)  50(0.7)   OUTPUT   Shift Total(mL/kg)       Weight (kg) 72.6 72.6 72.6 72.6       Physical Exam  NAD  Awake and alert  Head atraumatic   MMM, anterior tongue mobile, FOM soft, unable to visualize OP anteriorly secondary to patient tolerance of tongue blade/anatomy   Neck soft, not TTP, normal ROM, no LAD  Normal WOB, no stridor or stertor on room air; able to lie flat without difficult          PROCEDURE: Flexible Fiberoptic Laryngoscope Exam  The risks, benefits, and alternatives to the procedure were explained. Patient/family  agreed to procedure; verbal consent obtained. The scope was inserted into the left nasal cavity. Examination of the nasal cavity, nasopharynx, oropharynx, hypopharynx, and larynx was performed; all were closely visualized to confirm presence or absence of erythema, edema, or structural lesions.     Pertinent exam findings include the following:  - Mild edema of uvula, uvula positionally abuts laryngeal epiglottis   - Mild edema and erythema at esophageal inlet   - No obvious masses or lesions  - Mobility of bilateral TVF with normal adduction and abduction  - No pooling of secretions     The scope was removed. The patient tolerated the procedure well without complications.      Significant Labs:  CBC at OSH 10.3k with predominant granulocytes and lymphocytes   Rapid Influenza A/B, COVID, strep A all negative    Significant Diagnostics:  CT: I have reviewed all pertinent results/findings within the past 24 hours and my personal findings are:  mild edema of epiglottis       Assessment/Plan:     * Sore throat  Arlene Garcia is a 67 y.o. female with sore  throat x2 days. CT with concern for epiglottic edema. Mild leukocytosis with rapid Influenza A/B, COVID, strep A all negative. Bedside scope examination notable for uvular edema though no significant epiglottic edema with airway widely patent.     Given pt noted moderate improvement with antibiotic treatment and steroids and initial concern for epiglottitis at OSH, recommended mgmt with antibiotics + steroids. Discussed r/b/a of inpatient vs outpatient. Pt elects for outpatient management given lack of airway symptoms or concerns. Discussed with ED provider.    -- Return precautions provided including voice changes, noisy breathing, SOB, facial swelling, neck pain/stiffness  -- Discussed resuming previously prescribed PPI given esophageal inlet edema and mucosal findings suggestive of reflux  -- Pt &  voiced understanding of plan and return precautions   -- Remainder of care per ED  -- Please page ENT with any questions or concerns         VTE Risk Mitigation (From admission, onward)    None          Thank you for your consult. I will sign off. Please contact us if you have any additional questions.    Amina Monte MD  Otorhinolaryngology-Head & Neck Surgery  [facility]

## 2023-01-26 NOTE — ED PROVIDER NOTES
Chief Complaint   Sore Throat (Pt states cough and bad sore throat x2 days  )      History Of Present Illness   Arlene Garcia is a 67 y.o. female with  history of hypertension, glaucoma, GERD who presents as transfer hospital with concern for acute epiglottitis.  She went to an outside emergency department with sore throat and a cough, with a sensation of fullness, and she underwent a CT scan which showed thickening of the epiglottis, concerning for acute epiglottitis.  She denies any change to her voice, difficulty swallowing, or difficulty ranging her neck.  She was given steroids and antibiotics at the sending facility, and overall feels like she is improving.    History obtained from:  Patient and review of medical record    Review of patient's allergies indicates:  No Known Allergies    No current facility-administered medications on file prior to encounter.     Current Outpatient Medications on File Prior to Encounter   Medication Sig Dispense Refill    acetaZOLAMIDE (DIAMOX) 250 MG tablet       COMBIGAN 0.2-0.5 % Drop       diltiaZEM (TIAZAC) 240 MG Cs24 Take 300 mg by mouth.  Capsule, Sustained Release Oral      dorzolamide (TRUSOPT) 2 % ophthalmic solution Place 1 drop into both eyes 2 (two) times daily.      norethindrone (AYGESTIN) 5 mg Tab TAKE 1 TABLET(5 MG) BY MOUTH EVERY DAY 90 tablet 5       Past History   As per HPI and below:  Past Medical History:   Diagnosis Date    Clotting disorder     GERD (gastroesophageal reflux disease)     Glaucoma     elevated pressure in left eye     Hypertension     MGUS (monoclonal gammopathy of unknown significance)      Past Surgical History:   Procedure Laterality Date    bilateral eye sx         Social History     Socioeconomic History    Marital status:    Tobacco Use    Smoking status: Former     Packs/day: 0.50     Types: Cigarettes     Quit date: 1996     Years since quittin.0    Smokeless tobacco: Former   Substance and Sexual Activity     Alcohol use: Yes     Alcohol/week: 2.0 standard drinks     Types: 2 Cans of beer per week    Drug use: No    Sexual activity: Not Currently     Partners: Male     Birth control/protection: Post-menopausal       Family History   Problem Relation Age of Onset    Clotting disorder Mother     Colon cancer Mother     Ovarian cancer Neg Hx     Breast cancer Neg Hx        Physical Exam     Vitals:    01/25/23 1800 01/25/23 1931 01/25/23 2230 01/25/23 2352   BP: (!) 156/80 (!) 160/80  (!) 167/79   BP Location:       Patient Position:       Pulse: (!) 55 68 79 84   Resp: 18 17  16   Temp:  98.5 °F (36.9 °C)  98.3 °F (36.8 °C)   TempSrc:    Oral   SpO2: 99% 97% 100% 100%   Weight:         Gen: AxOx4, NAD, appears stated age, well appearing  Eye: EOMI, no scleral icterus, no periorbital edema or ecchymosis  Head: Normocephalic, atraumatic, no lesions, scalp grossly normal  ENT: neck supple, no stridor, no masses, no abnormal phonation or drooling, voice is normal, she is lying flat without any respiratory difficulty.  CVS: warm and well perfused, cap refill <2 seconds, no extremity pallor  PULM: normal work of breathing, no stridor, equal chest rise, no peripheral cyanosis  ABD: scaphoid, nondistended  Ext: no rash, no deformities, moving all joints with normal ROM  Neuro: GARZA, gait intact, face grossly symmetric  Psych: well groomed, mood and affect congruent, makes good eye contact, cooperative      Initial Impression   This is a 67 transfer for concern for acute epiglottitis.  Her voice is normal so I have overall low suspicion for this, but I did discuss the patient with ENT who saw and evaluated her and noted that the globus sensation the patient felt more likely corresponded to her uvula as opposed to the epiglottis.  Regardless there is no airway compromise.  Is possible that she had uvular hydrops, and improved with Decadron.  Plan for discharge home with antibiotics and Medrol Dosepak.    Medications Given      Medications   aluminum-magnesium hydroxide-simethicone 200-200-20 mg/5 mL suspension 30 mL (30 mLs Oral Given 1/25/23 1313)     And   LIDOcaine HCl 2% oral solution 15 mL (15 mLs Oral Given 1/25/23 1313)   iohexoL (OMNIPAQUE 350) injection 100 mL (100 mLs Intravenous Given 1/25/23 1558)   cefTRIAXone (ROCEPHIN) 1 g/50 mL D5W IVPB (0 g Intravenous Stopped 1/25/23 1742)   vancomycin (VANCOCIN) 1,000 mg in dextrose 5 % (D5W) 250 mL IVPB (Vial-Mate) (0 mg Intravenous Stopped 1/25/23 1944)   dexAMETHasone injection 12 mg (12 mg Intramuscular Given 1/25/23 1731)   amoxicillin-clavulanate 875-125mg per tablet 1 tablet (1 tablet Oral Given 1/25/23 2346)       Results and Course     Labs Reviewed   POCT CMP - Abnormal; Notable for the following components:       Result Value    POC Potassium 3.2 (*)     All other components within normal limits   CULTURE, RESPIRATORY  - THROAT   CULTURE, BLOOD   CULTURE, BLOOD   SARS-COV-2 RDRP GENE   POCT CBC   POCT STREP A, RAPID   POCT RAPID INFLUENZA A/B   POCT CMP       Imaging Results              CT Soft Tissue Neck With Contrast (Final result)  Result time 01/25/23 16:25:41      Final result by Oscar Jessica MD (01/25/23 16:25:41)                   Impression:      In light of the history, mild edema of the epiglottis and aryepiglottic folds at least mildly narrowing the supraglottic larynx is questioned.  No abscess.      Electronically signed by: Oscar Jessica  Date:    01/25/2023  Time:    16:25               Narrative:    EXAMINATION:  CT SOFT TISSUE NECK WITH CONTRAST    CLINICAL HISTORY:  Epiglottitis or tonsillitis suspected;    TECHNIQUE:  Low dose axial images as well as sagittal and coronal reconstructions were performed from the skull base to the clavicles following the intravenous administration of 75 mL of Omnipaque 350.    COMPARISON:  Plain film study dated 01/25/2022    FINDINGS:  In keeping with the plain film study, and in light of the history, there is the  suggestion of mild edema of the epiglottis and aryepiglottic folds at least mildly narrowing the supraglottic larynx.  No focal abscess.  No retropharyngeal edema.  No peritonsillar abscess.    No definite soft tissue mass or adenopathy is identified in the neck.    The paranasal sinuses and mastoid air cells are essentially clear.  No salivary gland calculi.  No evidence of acute odontogenic periapical abscess.                                       X-Ray Neck Soft Tissue (Final result)  Result time 01/25/23 14:32:15      Final result by Oscar Jessica MD (01/25/23 14:32:15)                   Impression:      Particularly in light of the history, there is thickening/edema of the epiglottis and potentially aryepiglottic folds questioned with effacement of the vallecula.  Epiglottitis or underlying mass to be considered.  CT imaging would be helpful for further characterization      Electronically signed by: Oscar Jessica  Date:    01/25/2023  Time:    14:32               Narrative:    EXAMINATION:  XR NECK SOFT TISSUE    CLINICAL HISTORY:  Acute pharyngitis, unspecified    TECHNIQUE:  AP and lateral soft tissue views the neck were performed.    COMPARISON:  None.    FINDINGS:  In light of the history, there is thickening of the epiglottis, and potentially aryepiglottic folds, questioned with effacement of the vallecula.    No radiopaque foreign body.    No osseous destructive process.  Degenerative changes of the cervical spine with ventral endplate osteophyte formation at C4-5 and C5-6                                             Final diagnoses:  [J02.9] Sore throat  [J05.10] Acute epiglottitis without airway obstruction (Primary)        ED Disposition Condition    Discharge Stable          ED Prescriptions       Medication Sig Dispense Start Date End Date Auth. Provider    amoxicillin-clavulanate 875-125mg (AUGMENTIN) 875-125 mg per tablet Take 1 tablet by mouth 2 (two) times daily. for 7 days 14 tablet  1/25/2023 2/1/2023 Brenda Cunningham MD    methylPREDNISolone (MEDROL DOSEPACK) 4 mg tablet Take per package instructions 1 each 1/25/2023 2/15/2023 Brenda Cunningham MD          Follow-up Information       Follow up With Specialties Details Why Contact Info    Kike Walker MD Internal Medicine Schedule an appointment as soon as possible for a visit   [address] [place] [address]  [phone]      [facility] - Emergency Dept Emergency Medicine  If symptoms worsen [address] [facility] [id number] [phone]               Brenda Cunningham MD  01/26/23 0347

## 2023-01-26 NOTE — ASSESSMENT & PLAN NOTE
Arlene Garcia is a 67 y.o. female with sore throat x2 days. CT with concern for epiglottic edema. Mild leukocytosis with rapid Influenza A/B, COVID, strep A all negative. Bedside scope examination notable for uvular edema though no significant epiglottic edema with airway widely patent.     Given pt noted moderate improvement with antibiotic treatment and steroids and initial concern for epiglottitis at OSH, recommended mgmt with antibiotics + steroids. Discussed r/b/a of inpatient vs outpatient. Pt elects for outpatient management given lack of airway symptoms or concerns. Discussed with ED provider.    -- Return precautions provided including voice changes, noisy breathing, SOB, facial swelling, neck pain/stiffness  -- Discussed resuming previously prescribed PPI given esophageal inlet edema and mucosal findings suggestive of reflux  -- Pt &  voiced understanding of plan and return precautions   -- Remainder of care per ED  -- Please page ENT with any questions or concerns

## 2023-01-26 NOTE — HPI
"Arlene Garcia is a 67 y.o. female with past medical and surgical history as detailed below including HTN, GERD, MGUS, acquired vWF def that presents to outside ED with a chief complaint of sore throat with associated coughing and gagging, onset x 2 days. Reports when swallowing, or after eating, she feels something in her throat (like mucus), but is unable to expel it; states swallowing exacerbates the gagging states the problem is intermittent. Endorses taking Mucinex and an allergy pill without no relief. No other alleviating or exacerbating factors noted. CT performed noting thickening/edema of epiglottis. Transferred for ENT evaluation. Given rocephin and decadron at OSH, pt notes that since receiving these medications she feels the "something" in her throat is still present but smaller that previously.  Denies fever, chills, rhinorrhea, SOB, dyspnea, voice changes, neck pain/stiffness, congestion, drooling, or ear pain.                 "

## 2023-01-28 LAB — BACTERIA THROAT CULT: NORMAL

## 2023-01-30 LAB
BACTERIA BLD CULT: NORMAL
BACTERIA BLD CULT: NORMAL

## 2023-06-22 ENCOUNTER — OFFICE VISIT (OUTPATIENT)
Dept: OBSTETRICS AND GYNECOLOGY | Facility: CLINIC | Age: 68
End: 2023-06-22
Payer: MEDICARE

## 2023-06-22 VITALS
BODY MASS INDEX: 28.33 KG/M2 | DIASTOLIC BLOOD PRESSURE: 76 MMHG | WEIGHT: 149.94 LBS | SYSTOLIC BLOOD PRESSURE: 136 MMHG

## 2023-06-22 DIAGNOSIS — Z01.419 WELL WOMAN EXAM WITH ROUTINE GYNECOLOGICAL EXAM: Primary | ICD-10-CM

## 2023-06-22 DIAGNOSIS — N94.89 PELVIC CONGESTION SYNDROME: ICD-10-CM

## 2023-06-22 DIAGNOSIS — Z12.39 BREAST CANCER SCREENING, HIGH RISK PATIENT: ICD-10-CM

## 2023-06-22 DIAGNOSIS — Z12.31 ENCOUNTER FOR SCREENING MAMMOGRAM FOR MALIGNANT NEOPLASM OF BREAST: ICD-10-CM

## 2023-06-22 DIAGNOSIS — Z80.3 FAMILY HISTORY OF BREAST CANCER IN SISTER: ICD-10-CM

## 2023-06-22 PROCEDURE — 99213 OFFICE O/P EST LOW 20 MIN: CPT | Mod: PBBFAC,25 | Performed by: OBSTETRICS & GYNECOLOGY

## 2023-06-22 PROCEDURE — G0101 CA SCREEN;PELVIC/BREAST EXAM: HCPCS | Mod: GZ,S$PBB,, | Performed by: OBSTETRICS & GYNECOLOGY

## 2023-06-22 PROCEDURE — G0101 PR CA SCREEN;PELVIC/BREAST EXAM: ICD-10-PCS | Mod: GZ,S$PBB,, | Performed by: OBSTETRICS & GYNECOLOGY

## 2023-06-22 PROCEDURE — 99999 PR PBB SHADOW E&M-EST. PATIENT-LVL III: ICD-10-PCS | Mod: PBBFAC,,, | Performed by: OBSTETRICS & GYNECOLOGY

## 2023-06-22 PROCEDURE — G0101 CA SCREEN;PELVIC/BREAST EXAM: HCPCS | Mod: PBBFAC | Performed by: OBSTETRICS & GYNECOLOGY

## 2023-06-22 PROCEDURE — 99999 PR PBB SHADOW E&M-EST. PATIENT-LVL III: CPT | Mod: PBBFAC,,, | Performed by: OBSTETRICS & GYNECOLOGY

## 2023-06-22 RX ORDER — NORETHINDRONE 5 MG/1
TABLET ORAL
Qty: 90 TABLET | Refills: 3 | Status: SHIPPED | OUTPATIENT
Start: 2023-06-22 | End: 2023-10-27

## 2023-06-22 NOTE — PROGRESS NOTES
SUBJECTIVE:   68 y.o. female   for annual routine Pap and checkup. No LMP recorded. Patient is postmenopausal..  She has no unusual complaints.        Past Medical History:   Diagnosis Date    Clotting disorder     GERD (gastroesophageal reflux disease)     Glaucoma     elevated pressure in left eye     Hypertension     MGUS (monoclonal gammopathy of unknown significance)      Past Surgical History:   Procedure Laterality Date    bilateral eye sx       Social History     Socioeconomic History    Marital status:    Tobacco Use    Smoking status: Former     Packs/day: 0.50     Types: Cigarettes     Quit date: 1996     Years since quittin.4    Smokeless tobacco: Former   Substance and Sexual Activity    Alcohol use: Yes     Alcohol/week: 2.0 standard drinks     Types: 2 Cans of beer per week    Drug use: No    Sexual activity: Not Currently     Partners: Male     Birth control/protection: Post-menopausal     Family History   Problem Relation Age of Onset    Clotting disorder Mother     Colon cancer Mother     Breast cancer Sister     Ovarian cancer Neg Hx      OB History    Para Term  AB Living   0 0 0 0 0 0   SAB IAB Ectopic Multiple Live Births   0 0 0 0           Current Outpatient Medications   Medication Sig Dispense Refill    acetaZOLAMIDE (DIAMOX) 250 MG tablet       COMBIGAN 0.2-0.5 % Drop       diltiaZEM (TIAZAC) 240 MG Cs24 Take 300 mg by mouth.  Capsule, Sustained Release Oral      dorzolamide (TRUSOPT) 2 % ophthalmic solution Place 1 drop into both eyes 2 (two) times daily.      norethindrone (AYGESTIN) 5 mg Tab TAKE 1 TABLET(5 MG) BY MOUTH EVERY DAY 90 tablet 3     No current facility-administered medications for this visit.     Allergies: Patient has no known allergies.     ROS:  Constitutional: no weight loss, weight gain, fever, fatigue  Eyes:  No vision changes, glasses/contacts  ENT/Mouth: No ulcers, sinus problems, ears ringing, headache  Cardiovascular: No  inability to lie flat, chest pain, exercise intolerance, swelling, heart palpitations  Respiratory: No wheezing, coughing blood, shortness of breath, or cough  Gastrointestinal: No diarrhea, bloody stool, nausea/vomiting, constipation, gas, hemorrhoids  Genitourinary: No blood in urine, painful urination, urgency of urination, frequency of urination, incomplete emptying, incontinence, abnormal bleeding, painful periods, heavy periods, vaginal discharge, vaginal odor, painful intercourse, sexual problems, bleeding after intercourse.  Musculoskeletal: No muscle weakness  Skin/Breast: No painful breasts, nipple discharge, masses, rash, ulcers  Neurological: No passing out, seizures, numbness, headache  Endocrine: No diabetes, hypothyroid, hyperthyroid, hot flashes, hair loss, abnormal hair growth, ance  Psychiatric: No depression, crying  Hematologic: No bruises, bleeding, swollen lymph nodes, anemia.      OBJECTIVE:   The patient appears well, alert, oriented x 3, in no distress.  /76   Wt 68 kg (149 lb 14.6 oz)   BMI 28.33 kg/m²   NECK: no thyromegaly, trachea midline  SKIN: no acne, striae, hirsutism  BREAST EXAM: breasts appear normal, no suspicious masses, no skin or nipple changes or axillary nodes  ABDOMEN: soft, non-tender; bowel sounds normal; no masses,  no organomegaly and no hernias, masses, or hepatosplenomegaly  GENITALIA: normal external genitalia, no erythema, no discharge  URETHRA: normal appearing urethra with no masses, tenderness or lesions and normal urethra, normal urethral meatus  VAGINA: Normal  CERVIX: no lesions or cervical motion tenderness  UTERUS: normal size, contour, position, consistency, mobility, non-tender  ADNEXA: no mass, fullness, tenderness    \  ASSESSMENT:   Viji was seen today for well woman.    Diagnoses and all orders for this visit:    Well woman exam with routine gynecological exam    Family history of breast cancer in sister  -     Mammo Digital Screening Bilat  w/ Reji; Future    Pelvic congestion syndrome  -     norethindrone (AYGESTIN) 5 mg Tab; TAKE 1 TABLET(5 MG) BY MOUTH EVERY DAY    Breast cancer screening, high risk patient  -     Mammo Digital Screening Bilat w/ Reji; Future    Encounter for screening mammogram for malignant neoplasm of breast  -     Mammo Digital Screening Bilat w/ Reji; Future

## 2023-08-03 ENCOUNTER — TELEPHONE (OUTPATIENT)
Dept: OBSTETRICS AND GYNECOLOGY | Facility: CLINIC | Age: 68
End: 2023-08-03
Payer: MEDICARE

## 2023-08-03 NOTE — TELEPHONE ENCOUNTER
----- Message from Leroy Colindres MD sent at 8/3/2023  1:07 PM CDT -----  It was coded correctly.    Breast cancer screening, high risk patient (z12.39)  Encounter for screening mammogram for malignant neoplasm of breast (z12.31).    She would need to reach out to billing for additional details if needed.    ----- Message -----  From: Becky Chen MA  Sent: 8/3/2023  12:18 PM CDT  To: Leroy Colindres MD      ----- Message -----  From: Bri Geiger  Sent: 8/3/2023  10:51 AM CDT  To: [facility] Staff    Name of Who is Calling: LEWIS MARCUS [[id number]]           What is the request in detail: Patient is requesting a call back from Dr. Colindres.  Patient needs a code for mammogram saying imaging was medically necessary.  Patient states she needs to code for medicare and  to cover the cost. Please assist.           Can the clinic reply by [facility]: No           What Number to Call Back if not in [facility]: [phone]

## 2023-08-03 NOTE — TELEPHONE ENCOUNTER
Spoke with pt, assist pt with requested codes. Informed pt to reach out to billing for any additional information per the provider. Pt verbalized understanding.

## 2023-10-27 DIAGNOSIS — N94.89 PELVIC CONGESTION SYNDROME: ICD-10-CM

## 2023-10-27 RX ORDER — NORETHINDRONE 5 MG/1
TABLET ORAL
Qty: 90 TABLET | Refills: 1 | Status: SHIPPED | OUTPATIENT
Start: 2023-10-27

## 2023-10-27 NOTE — TELEPHONE ENCOUNTER
Refill Decision Note   Arlene Jose  is requesting a refill authorization.  Brief Assessment and Rationale for Refill:  Approve     Medication Therapy Plan:  Dose: norethindrone, Oral; Geriactric .Acceptable use per [facility] protocol      Alert overridden per protocol: Yes   Comments:     Note composed:11:17 AM 10/27/2023

## 2023-12-15 ENCOUNTER — TELEPHONE (OUTPATIENT)
Dept: OBSTETRICS AND GYNECOLOGY | Facility: CLINIC | Age: 68
End: 2023-12-15
Payer: MEDICARE

## 2023-12-15 NOTE — TELEPHONE ENCOUNTER
Called pt in regards to informing her of reason for denying rx request, no answer, lvm asking pt to call office , also encouraging pt to contact pcp to refill prescription.

## 2024-04-25 DIAGNOSIS — N94.89 PELVIC CONGESTION SYNDROME: ICD-10-CM

## 2024-04-25 RX ORDER — NORETHINDRONE 5 MG/1
TABLET ORAL
Qty: 90 TABLET | Refills: 0 | Status: SHIPPED | OUTPATIENT
Start: 2024-04-25

## 2024-04-25 NOTE — TELEPHONE ENCOUNTER
Refill Decision Note   Arlene Garcia  is requesting a refill authorization.  Brief Assessment and Rationale for Refill:  Approve     Medication Therapy Plan:  Dose : geriatric/post menopausal use (Overridden by Leroy Colindres MD on Jun 22, 2023)      Alert overridden per protocol: Yes   Comments:     Note composed:12:31 PM 04/25/2024           
No

## 2024-06-24 DIAGNOSIS — N94.89 PELVIC CONGESTION SYNDROME: ICD-10-CM

## 2024-06-24 RX ORDER — NORETHINDRONE 5 MG/1
TABLET ORAL
Refills: 0 | OUTPATIENT
Start: 2024-06-24

## 2024-06-25 NOTE — TELEPHONE ENCOUNTER
Refill Decision Note   Arlene Garcia  is requesting a refill authorization.  Brief Assessment and Rationale for Refill:  Quick Discontinue     Medication Therapy Plan: Pharmacy is requesting new scripts for the following medications without required information, (sig/ frequency/qty/etc)      Comments:     Note composed:11:17 PM 06/24/2024

## 2024-07-24 DIAGNOSIS — N94.89 PELVIC CONGESTION SYNDROME: ICD-10-CM

## 2024-07-24 RX ORDER — NORETHINDRONE 5 MG/1
TABLET ORAL
Qty: 90 TABLET | Refills: 0 | Status: SHIPPED | OUTPATIENT
Start: 2024-07-24

## 2024-07-24 NOTE — TELEPHONE ENCOUNTER
Refill Decision Note   Arlene Garcia  is requesting a refill authorization.  Brief Assessment and Rationale for Refill:  Approve     Medication Therapy Plan:         Comments:     Note composed:11:20 AM 07/24/2024

## 2025-01-08 DIAGNOSIS — N94.89 PELVIC CONGESTION SYNDROME: ICD-10-CM

## 2025-01-09 RX ORDER — NORETHINDRONE 5 MG/1
TABLET ORAL
Refills: 0 | OUTPATIENT
Start: 2025-01-09

## 2025-01-09 NOTE — TELEPHONE ENCOUNTER
Refill Decision Note   Arlene Garcia  is requesting a refill authorization.  Brief Assessment and Rationale for Refill:  Quick Discontinue     Medication Therapy Plan:    Pharmacy is requesting new scripts for the following medications without required information, (sig/ frequency/qty/etc)         Comments: Pharmacies have been requesting medications for patients without required information, (sig, frequency, qty, etc.). In addition, requests are sent for medication(s) pt. are currently not taking, and medications patients have never taken.    We have spoken to the pharmacies about these request types and advised their teams previously that we are unable to assess these New Script requests and require all details for these requests. This is a known issue and has been reported.      Note composed:5:28 AM 01/09/2025

## 2025-01-23 DIAGNOSIS — N94.89 PELVIC CONGESTION SYNDROME: ICD-10-CM

## 2025-01-23 RX ORDER — NORETHINDRONE ACETATE 5 MG/1
5 TABLET ORAL
Qty: 90 TABLET | Refills: 0 | Status: SHIPPED | OUTPATIENT
Start: 2025-01-23

## 2025-01-23 NOTE — TELEPHONE ENCOUNTER
Refill Routing Note   Medication(s) are not appropriate for processing by [facility] for the following reason(s):        Patient not seen by provider within 15 months    [facility] action(s):  Defer               Appointments  past 12m or future 3m with PCP    Date Provider   Last Visit   6/22/2023 Leroy Colindres MD   Next Visit   Visit date not found Leroy Colindres MD   ED visits in past 90 days: 0        Note composed:5:47 AM 01/23/2025

## 2025-06-01 DIAGNOSIS — N94.89 PELVIC CONGESTION SYNDROME: ICD-10-CM

## 2025-06-02 RX ORDER — NORETHINDRONE 5 MG/1
5 TABLET ORAL DAILY
Qty: 90 TABLET | Refills: 0 | Status: SHIPPED | OUTPATIENT
Start: 2025-06-02

## 2025-08-04 DIAGNOSIS — N94.89 PELVIC CONGESTION SYNDROME: ICD-10-CM

## 2025-08-04 RX ORDER — NORETHINDRONE 5 MG/1
TABLET ORAL
Refills: 0 | OUTPATIENT
Start: 2025-08-04

## 2025-08-04 NOTE — TELEPHONE ENCOUNTER
Refill Decision Note   Arlene Garcia  is requesting a refill authorization.  Brief Assessment and Rationale for Refill:  Quick Discontinue     Medication Therapy Plan: Pharmacy is requesting new scripts for the following medications without required information, (sig/ frequency/qty/etc)          Comments: Pharmacies have been requesting medications for patients without required information, (sig, frequency, qty, etc.). In addition, requests are sent for medication(s) pt. are currently not taking, and medications patients have never taken.    We have spoken to the pharmacies about these request types and advised their teams previously that we are unable to assess these New Script requests and require all details for these requests. This is a known issue and has been reported.     Note composed:12:48 PM 08/04/2025

## 2025-08-25 DIAGNOSIS — N94.89 PELVIC CONGESTION SYNDROME: ICD-10-CM

## 2025-08-25 RX ORDER — NORETHINDRONE 5 MG/1
5 TABLET ORAL DAILY
Qty: 90 TABLET | Refills: 0 | Status: SHIPPED | OUTPATIENT
Start: 2025-08-25